# Patient Record
Sex: MALE | Race: OTHER | HISPANIC OR LATINO | URBAN - METROPOLITAN AREA
[De-identification: names, ages, dates, MRNs, and addresses within clinical notes are randomized per-mention and may not be internally consistent; named-entity substitution may affect disease eponyms.]

---

## 2019-09-17 ENCOUNTER — EMERGENCY (EMERGENCY)
Facility: HOSPITAL | Age: 39
LOS: 1 days | Discharge: OTHER INSTITUTION | End: 2019-09-17
Attending: EMERGENCY MEDICINE
Payer: COMMERCIAL

## 2019-09-17 VITALS
WEIGHT: 147.93 LBS | RESPIRATION RATE: 16 BRPM | DIASTOLIC BLOOD PRESSURE: 84 MMHG | SYSTOLIC BLOOD PRESSURE: 129 MMHG | OXYGEN SATURATION: 97 % | HEIGHT: 63.39 IN | TEMPERATURE: 98 F | HEART RATE: 96 BPM

## 2019-09-17 VITALS
SYSTOLIC BLOOD PRESSURE: 138 MMHG | RESPIRATION RATE: 18 BRPM | OXYGEN SATURATION: 98 % | DIASTOLIC BLOOD PRESSURE: 81 MMHG | HEART RATE: 90 BPM | TEMPERATURE: 99 F

## 2019-09-17 LAB
ACETONE SERPL-MCNC: NEGATIVE — SIGNIFICANT CHANGE UP
ALBUMIN SERPL ELPH-MCNC: 4 G/DL — SIGNIFICANT CHANGE UP (ref 3.3–5.2)
ALP SERPL-CCNC: 156 U/L — HIGH (ref 40–120)
ALT FLD-CCNC: 18 U/L — SIGNIFICANT CHANGE UP
ANION GAP SERPL CALC-SCNC: 17 MMOL/L — SIGNIFICANT CHANGE UP (ref 5–17)
APTT BLD: 26.6 SEC — LOW (ref 27.5–36.3)
AST SERPL-CCNC: 19 U/L — SIGNIFICANT CHANGE UP
BASOPHILS # BLD AUTO: 0.06 K/UL — SIGNIFICANT CHANGE UP (ref 0–0.2)
BASOPHILS NFR BLD AUTO: 0.5 % — SIGNIFICANT CHANGE UP (ref 0–2)
BILIRUB SERPL-MCNC: 0.3 MG/DL — LOW (ref 0.4–2)
BUN SERPL-MCNC: 21 MG/DL — HIGH (ref 8–20)
CALCIUM SERPL-MCNC: 9.2 MG/DL — SIGNIFICANT CHANGE UP (ref 8.6–10.2)
CHLORIDE SERPL-SCNC: 88 MMOL/L — LOW (ref 98–107)
CK SERPL-CCNC: 119 U/L — SIGNIFICANT CHANGE UP (ref 30–200)
CO2 SERPL-SCNC: 20 MMOL/L — LOW (ref 22–29)
CREAT SERPL-MCNC: 1.55 MG/DL — HIGH (ref 0.5–1.3)
EOSINOPHIL # BLD AUTO: 0.18 K/UL — SIGNIFICANT CHANGE UP (ref 0–0.5)
EOSINOPHIL NFR BLD AUTO: 1.5 % — SIGNIFICANT CHANGE UP (ref 0–6)
GLUCOSE SERPL-MCNC: 741 MG/DL — CRITICAL HIGH (ref 70–115)
HCT VFR BLD CALC: 35.9 % — LOW (ref 39–50)
HGB BLD-MCNC: 12.3 G/DL — LOW (ref 13–17)
IMM GRANULOCYTES NFR BLD AUTO: 1 % — SIGNIFICANT CHANGE UP (ref 0–1.5)
INR BLD: 0.94 RATIO — SIGNIFICANT CHANGE UP (ref 0.88–1.16)
LACTATE BLDV-MCNC: 2.4 MMOL/L — HIGH (ref 0.5–2)
LYMPHOCYTES # BLD AUTO: 2.98 K/UL — SIGNIFICANT CHANGE UP (ref 1–3.3)
LYMPHOCYTES # BLD AUTO: 25.4 % — SIGNIFICANT CHANGE UP (ref 13–44)
MCHC RBC-ENTMCNC: 30.1 PG — SIGNIFICANT CHANGE UP (ref 27–34)
MCHC RBC-ENTMCNC: 34.3 GM/DL — SIGNIFICANT CHANGE UP (ref 32–36)
MCV RBC AUTO: 88 FL — SIGNIFICANT CHANGE UP (ref 80–100)
MONOCYTES # BLD AUTO: 0.87 K/UL — SIGNIFICANT CHANGE UP (ref 0–0.9)
MONOCYTES NFR BLD AUTO: 7.4 % — SIGNIFICANT CHANGE UP (ref 2–14)
NEUTROPHILS # BLD AUTO: 7.54 K/UL — HIGH (ref 1.8–7.4)
NEUTROPHILS NFR BLD AUTO: 64.2 % — SIGNIFICANT CHANGE UP (ref 43–77)
PLATELET # BLD AUTO: 240 K/UL — SIGNIFICANT CHANGE UP (ref 150–400)
POTASSIUM SERPL-MCNC: 4.3 MMOL/L — SIGNIFICANT CHANGE UP (ref 3.5–5.3)
POTASSIUM SERPL-SCNC: 4.3 MMOL/L — SIGNIFICANT CHANGE UP (ref 3.5–5.3)
PROT SERPL-MCNC: 8.4 G/DL — SIGNIFICANT CHANGE UP (ref 6.6–8.7)
PROTHROM AB SERPL-ACNC: 10.8 SEC — SIGNIFICANT CHANGE UP (ref 10–12.9)
RBC # BLD: 4.08 M/UL — LOW (ref 4.2–5.8)
RBC # FLD: 12 % — SIGNIFICANT CHANGE UP (ref 10.3–14.5)
SODIUM SERPL-SCNC: 125 MMOL/L — LOW (ref 135–145)
WBC # BLD: 11.75 K/UL — HIGH (ref 3.8–10.5)
WBC # FLD AUTO: 11.75 K/UL — HIGH (ref 3.8–10.5)

## 2019-09-17 PROCEDURE — 96375 TX/PRO/DX INJ NEW DRUG ADDON: CPT

## 2019-09-17 PROCEDURE — 85027 COMPLETE CBC AUTOMATED: CPT

## 2019-09-17 PROCEDURE — T1013: CPT

## 2019-09-17 PROCEDURE — 96374 THER/PROPH/DIAG INJ IV PUSH: CPT

## 2019-09-17 PROCEDURE — 36415 COLL VENOUS BLD VENIPUNCTURE: CPT

## 2019-09-17 PROCEDURE — 73130 X-RAY EXAM OF HAND: CPT | Mod: 26,RT,77

## 2019-09-17 PROCEDURE — 99284 EMERGENCY DEPT VISIT MOD MDM: CPT

## 2019-09-17 PROCEDURE — 96361 HYDRATE IV INFUSION ADD-ON: CPT

## 2019-09-17 PROCEDURE — 82962 GLUCOSE BLOOD TEST: CPT

## 2019-09-17 PROCEDURE — 85610 PROTHROMBIN TIME: CPT

## 2019-09-17 PROCEDURE — 80053 COMPREHEN METABOLIC PANEL: CPT

## 2019-09-17 PROCEDURE — 73130 X-RAY EXAM OF HAND: CPT | Mod: 26,LT

## 2019-09-17 PROCEDURE — 82009 KETONE BODYS QUAL: CPT

## 2019-09-17 PROCEDURE — 99285 EMERGENCY DEPT VISIT HI MDM: CPT | Mod: 25

## 2019-09-17 PROCEDURE — 83605 ASSAY OF LACTIC ACID: CPT

## 2019-09-17 PROCEDURE — 85730 THROMBOPLASTIN TIME PARTIAL: CPT

## 2019-09-17 PROCEDURE — 82550 ASSAY OF CK (CPK): CPT

## 2019-09-17 PROCEDURE — 73130 X-RAY EXAM OF HAND: CPT

## 2019-09-17 PROCEDURE — 90715 TDAP VACCINE 7 YRS/> IM: CPT

## 2019-09-17 RX ORDER — SODIUM CHLORIDE 9 MG/ML
1000 INJECTION INTRAMUSCULAR; INTRAVENOUS; SUBCUTANEOUS ONCE
Refills: 0 | Status: COMPLETED | OUTPATIENT
Start: 2019-09-17 | End: 2019-09-17

## 2019-09-17 RX ORDER — INSULIN HUMAN 100 [IU]/ML
5 INJECTION, SOLUTION SUBCUTANEOUS ONCE
Refills: 0 | Status: COMPLETED | OUTPATIENT
Start: 2019-09-17 | End: 2019-09-17

## 2019-09-17 RX ORDER — INSULIN HUMAN 100 [IU]/ML
10 INJECTION, SOLUTION SUBCUTANEOUS ONCE
Refills: 0 | Status: COMPLETED | OUTPATIENT
Start: 2019-09-17 | End: 2019-09-17

## 2019-09-17 RX ORDER — VANCOMYCIN HCL 1 G
1000 VIAL (EA) INTRAVENOUS ONCE
Refills: 0 | Status: COMPLETED | OUTPATIENT
Start: 2019-09-17 | End: 2019-09-17

## 2019-09-17 RX ORDER — MORPHINE SULFATE 50 MG/1
4 CAPSULE, EXTENDED RELEASE ORAL ONCE
Refills: 0 | Status: DISCONTINUED | OUTPATIENT
Start: 2019-09-17 | End: 2019-09-17

## 2019-09-17 RX ORDER — TETANUS TOXOID, REDUCED DIPHTHERIA TOXOID AND ACELLULAR PERTUSSIS VACCINE, ADSORBED 5; 2.5; 8; 8; 2.5 [IU]/.5ML; [IU]/.5ML; UG/.5ML; UG/.5ML; UG/.5ML
0.5 SUSPENSION INTRAMUSCULAR ONCE
Refills: 0 | Status: COMPLETED | OUTPATIENT
Start: 2019-09-17 | End: 2019-09-17

## 2019-09-17 RX ADMIN — INSULIN HUMAN 10 UNIT(S): 100 INJECTION, SOLUTION SUBCUTANEOUS at 19:58

## 2019-09-17 RX ADMIN — SODIUM CHLORIDE 1000 MILLILITER(S): 9 INJECTION INTRAMUSCULAR; INTRAVENOUS; SUBCUTANEOUS at 18:42

## 2019-09-17 RX ADMIN — SODIUM CHLORIDE 1000 MILLILITER(S): 9 INJECTION INTRAMUSCULAR; INTRAVENOUS; SUBCUTANEOUS at 20:03

## 2019-09-17 RX ADMIN — MORPHINE SULFATE 4 MILLIGRAM(S): 50 CAPSULE, EXTENDED RELEASE ORAL at 18:21

## 2019-09-17 RX ADMIN — SODIUM CHLORIDE 1000 MILLILITER(S): 9 INJECTION INTRAMUSCULAR; INTRAVENOUS; SUBCUTANEOUS at 19:12

## 2019-09-17 RX ADMIN — MORPHINE SULFATE 4 MILLIGRAM(S): 50 CAPSULE, EXTENDED RELEASE ORAL at 18:03

## 2019-09-17 RX ADMIN — INSULIN HUMAN 5 UNIT(S): 100 INJECTION, SOLUTION SUBCUTANEOUS at 19:54

## 2019-09-17 RX ADMIN — SODIUM CHLORIDE 1000 MILLILITER(S): 9 INJECTION INTRAMUSCULAR; INTRAVENOUS; SUBCUTANEOUS at 18:03

## 2019-09-17 RX ADMIN — TETANUS TOXOID, REDUCED DIPHTHERIA TOXOID AND ACELLULAR PERTUSSIS VACCINE, ADSORBED 0.5 MILLILITER(S): 5; 2.5; 8; 8; 2.5 SUSPENSION INTRAMUSCULAR at 18:03

## 2019-09-17 RX ADMIN — Medication 250 MILLIGRAM(S): at 19:12
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
## 2019-09-17 NOTE — ED PROVIDER NOTE - NSRISKOFTRANSFER_ED_A_ED
Transportation Risk (There is always a risk of traffic delays resulting in deterioration of condition.)/Death or Disability/Other:/Increased Pain/Deterioration of Condition En Route
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
## 2021-02-11 ENCOUNTER — EMERGENCY (EMERGENCY)
Facility: HOSPITAL | Age: 41
LOS: 1 days | Discharge: HOME | End: 2021-02-11
Attending: STUDENT IN AN ORGANIZED HEALTH CARE EDUCATION/TRAINING PROGRAM
Payer: COMMERCIAL

## 2021-02-11 VITALS
DIASTOLIC BLOOD PRESSURE: 77 MMHG | WEIGHT: 179.9 LBS | OXYGEN SATURATION: 96 % | SYSTOLIC BLOOD PRESSURE: 122 MMHG | RESPIRATION RATE: 18 BRPM | HEART RATE: 118 BPM | TEMPERATURE: 100 F | HEIGHT: 63.39 IN

## 2021-02-11 PROCEDURE — 99285 EMERGENCY DEPT VISIT HI MDM: CPT

## 2021-02-11 RX ORDER — ACETAMINOPHEN 500 MG
975 TABLET ORAL ONCE
Refills: 0 | Status: COMPLETED | OUTPATIENT
Start: 2021-02-11 | End: 2021-02-11

## 2021-02-11 RX ORDER — SODIUM CHLORIDE 9 MG/ML
1000 INJECTION INTRAMUSCULAR; INTRAVENOUS; SUBCUTANEOUS ONCE
Refills: 0 | Status: COMPLETED | OUTPATIENT
Start: 2021-02-11 | End: 2021-02-11

## 2021-02-11 RX ORDER — ONDANSETRON 8 MG/1
4 TABLET, FILM COATED ORAL ONCE
Refills: 0 | Status: COMPLETED | OUTPATIENT
Start: 2021-02-11 | End: 2021-02-11

## 2021-02-11 NOTE — ED ADULT TRIAGE NOTE - CHIEF COMPLAINT QUOTE
C/o nausea and vomiting since 12pm. +Fever and chills. Denies abdominal pain. Pt states he is unable to tolerate PO. Hx of DMT2, POCT .

## 2021-02-12 VITALS
DIASTOLIC BLOOD PRESSURE: 76 MMHG | TEMPERATURE: 99 F | OXYGEN SATURATION: 97 % | HEART RATE: 99 BPM | RESPIRATION RATE: 16 BRPM | SYSTOLIC BLOOD PRESSURE: 123 MMHG

## 2021-02-12 PROBLEM — E11.9 TYPE 2 DIABETES MELLITUS WITHOUT COMPLICATIONS: Chronic | Status: ACTIVE | Noted: 2019-09-17

## 2021-02-12 LAB
ACETONE SERPL-MCNC: NEGATIVE — SIGNIFICANT CHANGE UP
ALBUMIN SERPL ELPH-MCNC: 4.2 G/DL — SIGNIFICANT CHANGE UP (ref 3.3–5.2)
ALP SERPL-CCNC: 100 U/L — SIGNIFICANT CHANGE UP (ref 40–120)
ALT FLD-CCNC: 13 U/L — SIGNIFICANT CHANGE UP
ANION GAP SERPL CALC-SCNC: 15 MMOL/L — SIGNIFICANT CHANGE UP (ref 5–17)
AST SERPL-CCNC: 16 U/L — SIGNIFICANT CHANGE UP
BASE EXCESS BLDV CALC-SCNC: 2.6 MMOL/L — HIGH (ref -2–2)
BASOPHILS # BLD AUTO: 0.06 K/UL — SIGNIFICANT CHANGE UP (ref 0–0.2)
BASOPHILS NFR BLD AUTO: 0.3 % — SIGNIFICANT CHANGE UP (ref 0–2)
BILIRUB SERPL-MCNC: 0.4 MG/DL — SIGNIFICANT CHANGE UP (ref 0.4–2)
BUN SERPL-MCNC: 24 MG/DL — HIGH (ref 8–20)
CA-I SERPL-SCNC: 1.14 MMOL/L — LOW (ref 1.15–1.33)
CALCIUM SERPL-MCNC: 9.6 MG/DL — SIGNIFICANT CHANGE UP (ref 8.6–10.2)
CHLORIDE BLDV-SCNC: 105 MMOL/L — SIGNIFICANT CHANGE UP (ref 98–107)
CHLORIDE SERPL-SCNC: 102 MMOL/L — SIGNIFICANT CHANGE UP (ref 98–107)
CO2 SERPL-SCNC: 24 MMOL/L — SIGNIFICANT CHANGE UP (ref 22–29)
CREAT SERPL-MCNC: 1.42 MG/DL — HIGH (ref 0.5–1.3)
EOSINOPHIL # BLD AUTO: 0.09 K/UL — SIGNIFICANT CHANGE UP (ref 0–0.5)
EOSINOPHIL NFR BLD AUTO: 0.5 % — SIGNIFICANT CHANGE UP (ref 0–6)
GAS PNL BLDV: 145 MMOL/L — SIGNIFICANT CHANGE UP (ref 135–145)
GAS PNL BLDV: SIGNIFICANT CHANGE UP
GAS PNL BLDV: SIGNIFICANT CHANGE UP
GLUCOSE BLDV-MCNC: 293 MG/DL — HIGH (ref 70–99)
GLUCOSE SERPL-MCNC: 297 MG/DL — HIGH (ref 70–99)
HCO3 BLDV-SCNC: 27 MMOL/L — SIGNIFICANT CHANGE UP (ref 21–29)
HCT VFR BLD CALC: 33.8 % — LOW (ref 39–50)
HCT VFR BLD CALC: 39.8 % — SIGNIFICANT CHANGE UP (ref 39–50)
HCT VFR BLDA CALC: 43 — SIGNIFICANT CHANGE UP (ref 39–50)
HGB BLD CALC-MCNC: 14.1 G/DL — SIGNIFICANT CHANGE UP (ref 13–17)
HGB BLD-MCNC: 11.6 G/DL — LOW (ref 13–17)
HGB BLD-MCNC: 14 G/DL — SIGNIFICANT CHANGE UP (ref 13–17)
IMM GRANULOCYTES NFR BLD AUTO: 1.3 % — SIGNIFICANT CHANGE UP (ref 0–1.5)
LACTATE BLDV-MCNC: 1.9 MMOL/L — SIGNIFICANT CHANGE UP (ref 0.5–2)
LIDOCAIN IGE QN: 89 U/L — HIGH (ref 22–51)
LYMPHOCYTES # BLD AUTO: 1.74 K/UL — SIGNIFICANT CHANGE UP (ref 1–3.3)
LYMPHOCYTES # BLD AUTO: 9.2 % — LOW (ref 13–44)
MCHC RBC-ENTMCNC: 29 PG — SIGNIFICANT CHANGE UP (ref 27–34)
MCHC RBC-ENTMCNC: 29.4 PG — SIGNIFICANT CHANGE UP (ref 27–34)
MCHC RBC-ENTMCNC: 34.3 GM/DL — SIGNIFICANT CHANGE UP (ref 32–36)
MCHC RBC-ENTMCNC: 35.2 GM/DL — SIGNIFICANT CHANGE UP (ref 32–36)
MCV RBC AUTO: 83.4 FL — SIGNIFICANT CHANGE UP (ref 80–100)
MCV RBC AUTO: 84.5 FL — SIGNIFICANT CHANGE UP (ref 80–100)
MONOCYTES # BLD AUTO: 1.81 K/UL — HIGH (ref 0–0.9)
MONOCYTES NFR BLD AUTO: 9.6 % — SIGNIFICANT CHANGE UP (ref 2–14)
NEUTROPHILS # BLD AUTO: 14.99 K/UL — HIGH (ref 1.8–7.4)
NEUTROPHILS NFR BLD AUTO: 79.1 % — HIGH (ref 43–77)
OTHER CELLS CSF MANUAL: 19 ML/DL — SIGNIFICANT CHANGE UP (ref 18–22)
PCO2 BLDV: 40 MMHG — SIGNIFICANT CHANGE UP (ref 35–50)
PH BLDV: 7.44 — HIGH (ref 7.32–7.43)
PLATELET # BLD AUTO: 262 K/UL — SIGNIFICANT CHANGE UP (ref 150–400)
PLATELET # BLD AUTO: 278 K/UL — SIGNIFICANT CHANGE UP (ref 150–400)
PO2 BLDV: 110 MMHG — HIGH (ref 25–45)
POTASSIUM BLDV-SCNC: 3.9 MMOL/L — SIGNIFICANT CHANGE UP (ref 3.4–4.5)
POTASSIUM SERPL-MCNC: 3.9 MMOL/L — SIGNIFICANT CHANGE UP (ref 3.5–5.3)
POTASSIUM SERPL-SCNC: 3.9 MMOL/L — SIGNIFICANT CHANGE UP (ref 3.5–5.3)
PROT SERPL-MCNC: 8.3 G/DL — SIGNIFICANT CHANGE UP (ref 6.6–8.7)
RBC # BLD: 4 M/UL — LOW (ref 4.2–5.8)
RBC # BLD: 4.77 M/UL — SIGNIFICANT CHANGE UP (ref 4.2–5.8)
RBC # FLD: 13.2 % — SIGNIFICANT CHANGE UP (ref 10.3–14.5)
RBC # FLD: 13.4 % — SIGNIFICANT CHANGE UP (ref 10.3–14.5)
SAO2 % BLDV: 98 % — SIGNIFICANT CHANGE UP
SARS-COV-2 RNA SPEC QL NAA+PROBE: SIGNIFICANT CHANGE UP
SODIUM SERPL-SCNC: 141 MMOL/L — SIGNIFICANT CHANGE UP (ref 135–145)
WBC # BLD: 18.25 K/UL — HIGH (ref 3.8–10.5)
WBC # BLD: 18.94 K/UL — HIGH (ref 3.8–10.5)
WBC # FLD AUTO: 18.25 K/UL — HIGH (ref 3.8–10.5)
WBC # FLD AUTO: 18.94 K/UL — HIGH (ref 3.8–10.5)

## 2021-02-12 PROCEDURE — 74177 CT ABD & PELVIS W/CONTRAST: CPT | Mod: 26

## 2021-02-12 PROCEDURE — 80053 COMPREHEN METABOLIC PANEL: CPT

## 2021-02-12 PROCEDURE — 82962 GLUCOSE BLOOD TEST: CPT

## 2021-02-12 PROCEDURE — 85018 HEMOGLOBIN: CPT

## 2021-02-12 PROCEDURE — 83605 ASSAY OF LACTIC ACID: CPT

## 2021-02-12 PROCEDURE — 82435 ASSAY OF BLOOD CHLORIDE: CPT

## 2021-02-12 PROCEDURE — 74177 CT ABD & PELVIS W/CONTRAST: CPT

## 2021-02-12 PROCEDURE — 82009 KETONE BODYS QUAL: CPT

## 2021-02-12 PROCEDURE — 84132 ASSAY OF SERUM POTASSIUM: CPT

## 2021-02-12 PROCEDURE — 85027 COMPLETE CBC AUTOMATED: CPT

## 2021-02-12 PROCEDURE — 83690 ASSAY OF LIPASE: CPT

## 2021-02-12 PROCEDURE — 36415 COLL VENOUS BLD VENIPUNCTURE: CPT

## 2021-02-12 PROCEDURE — 82330 ASSAY OF CALCIUM: CPT

## 2021-02-12 PROCEDURE — 99284 EMERGENCY DEPT VISIT MOD MDM: CPT | Mod: 25

## 2021-02-12 PROCEDURE — U0005: CPT

## 2021-02-12 PROCEDURE — 85014 HEMATOCRIT: CPT

## 2021-02-12 PROCEDURE — 85025 COMPLETE CBC W/AUTO DIFF WBC: CPT

## 2021-02-12 PROCEDURE — 82947 ASSAY GLUCOSE BLOOD QUANT: CPT

## 2021-02-12 PROCEDURE — 96374 THER/PROPH/DIAG INJ IV PUSH: CPT | Mod: XU

## 2021-02-12 PROCEDURE — 96361 HYDRATE IV INFUSION ADD-ON: CPT

## 2021-02-12 PROCEDURE — 84295 ASSAY OF SERUM SODIUM: CPT

## 2021-02-12 PROCEDURE — 82803 BLOOD GASES ANY COMBINATION: CPT

## 2021-02-12 PROCEDURE — U0003: CPT

## 2021-02-12 RX ORDER — SODIUM CHLORIDE 9 MG/ML
1000 INJECTION INTRAMUSCULAR; INTRAVENOUS; SUBCUTANEOUS ONCE
Refills: 0 | Status: COMPLETED | OUTPATIENT
Start: 2021-02-12 | End: 2021-02-12

## 2021-02-12 RX ADMIN — Medication 975 MILLIGRAM(S): at 00:13

## 2021-02-12 RX ADMIN — SODIUM CHLORIDE 1000 MILLILITER(S): 9 INJECTION INTRAMUSCULAR; INTRAVENOUS; SUBCUTANEOUS at 02:04

## 2021-02-12 RX ADMIN — ONDANSETRON 4 MILLIGRAM(S): 8 TABLET, FILM COATED ORAL at 00:13

## 2021-02-12 RX ADMIN — SODIUM CHLORIDE 1000 MILLILITER(S): 9 INJECTION INTRAMUSCULAR; INTRAVENOUS; SUBCUTANEOUS at 00:13

## 2021-02-12 NOTE — ED PROVIDER NOTE - PROGRESS NOTE DETAILS
Pt re-assessed, noting improvement in vomiting s/p meds. No vomiting at this time. Pt resting comfortably at time of re-assessment. No vomiting while in ED. Abdomen soft/non-tender. Tolerating PO intake. Given rpt wbc of 18k will check ct abd/pel CT showing "Marked wall thickening of the distal esophagus, suggestive of esophagitis. Consider upper endoscopy for further evaluation." likely 2/2 vomiting. results including incidental findings d/w pt, instructed to f/u with GI for endoscopy. return precautions discussed, stable for dc

## 2021-02-12 NOTE — ED PROVIDER NOTE - PHYSICAL EXAMINATION
Gen: No acute distress, non toxic  HEENT: Mucous membranes moist, pink conjunctivae, EOMI  CV: RRR, nl s1/s2.  Resp: CTAB, normal rate and effort  GI: Abdomen soft, NT, ND. No rebound, no guarding  Neuro: A&O x 3, moving all 4 extremities  MSK: No spine or joint tenderness to palpation  Skin: No rashes. intact and perfused.

## 2021-02-12 NOTE — ED PROVIDER NOTE - OBJECTIVE STATEMENT
39yo M pmhx DMII on metformin, insulin presents to ED c/o nausea and vomiting onset 12pm today. States he has been vomiting the entire day approx "30-40 times" and is unable to tolerate PO. States his throat is a little sore from vomiting so much. No abdominal pain. No recent travel or sick contacts. Felt fine all day yesterday. Did not feel like he had a fever at home. Temp 99.5 oral in triage,  bpm. No hx similar symptoms in past. No hx DKA. Denies cp, sob, diarrhea, recent travel, hx abdominal surgery.   : Luis Reyes

## 2021-02-12 NOTE — ED ADULT NURSE NOTE - OBJECTIVE STATEMENT
Patient A&Ox4 complaining of vomiting x1 day.  Pt with hx of DM, takes metformin.  Denies SOB, abdominal pain, fever, chills, recent sick contacts, diarrhea.  NAD noted, respirations even and unlabored.  Safety precautions in place.  Plan of care explained, pt verbalized understanding.  services used. KAVON Mark at bedside for eval.

## 2021-02-12 NOTE — ED PROVIDER NOTE - ATTENDING CONTRIBUTION TO CARE
41yo male with pmh of DM on insulin presents with nausea and vomiting for 12 hours about 30-40 times, Pt states he's been unable to tolerate PO, emesis nbnb. Pt also reports sore throat from throwing up so much. Pt states night prior to symptoms onset pt had some chicken wings and wasn't sure if that was causing his symptoms. Pt also with loose stools. Pt denies fevers/chills, ha, loc, focal neuro deficits, cp/sob/palp, cough, abd pain, urinary symptoms, recent travel and sick contacts.  Const: Awake, alert and oriented. In no acute distress. Well appearing.  HEENT: NC/AT. Moist mucous membranes.  Eyes: No scleral icterus. EOMI.  Neck:. Soft and supple. Full ROM without pain.  Cardiac: Regular rate (80s) and regular rhythm. +S1/S2. Peripheral pulses 2+ and symmetric. No LE edema.  Resp: Speaking in full sentences. No evidence of respiratory distress. No wheezes, rales or rhonchi.  Abd: Soft, non-tender, non-distended. Normal bowel sounds in all 4 quadrants. No guarding or rebound.  Back: Spine midline and non-tender. No CVAT.  Skin: No rashes, abrasions or lacerations.  Lymph: No cervical lymphadenopathy.  Neuro: Awake, alert & oriented x 3. Moves all extremities symmetrically.  labs, ct, anti emetic  pt tolerated po, follow up with pmd

## 2021-02-12 NOTE — ED PROVIDER NOTE - CLINICAL SUMMARY MEDICAL DECISION MAKING FREE TEXT BOX
41yo M with persistent vomiting today. No abdominal pain, Abd exam unremarkable. Will hydrate with IVF, give zofran, check labs to eval for dka, covid swab and re-assess.

## 2021-02-12 NOTE — ED PROVIDER NOTE - NSFOLLOWUPINSTRUCTIONS_ED_ALL_ED_FT
- Follow up with your doctor within 2-3 days.   - Return to the ED for any new or worsening symptoms.   - Follow-up with GI doctor for further evaluation within 1-2 weeks  - Your CT scan also showed "indeterminate right lower pole renal lesions" You may follow-up with your primary doctor for further evaluation of this finding.    Nausea / Vomiting    Nausea is the feeling that you have to vomit. As nausea gets worse, it can lead to vomiting. Vomiting puts you at an increased risk for dehydration. Older adults and people with other diseases or a weak immune system are at higher risk for dehydration. Drink clear fluids in small but frequent amounts as tolerated. Eat bland, easy-to-digest foods in small amounts as tolerated.    SEEK IMMEDIATE MEDICAL CARE IF YOU HAVE ANY OF THE FOLLOWING SYMPTOMS: fever, inability to keep sufficient fluids down, black or bloody vomitus, black or bloody stools, lightheadedness/dizziness, chest pain, severe headache, rash, shortness of breath, cold or clammy skin, confusion, pain with urination, or any signs of dehydration.     - Seguimiento con jorge médico dentro de 2-3 días.   - Volver al ED para cualquier síntoma nuevo o empeoramiento.   - Seguimiento con el médico GI para adilene evaluación adicional dentro de 1-2 semanas  - Jorge tomografía computarizada también mostró "lesiones renales indeterminadas del polo inferior derecho" Puede realizar un seguimiento con jorge médico de cabecera para adilene evaluación adicional de pascual hallazgo.    Náuseas / Vómitos    Las náuseas son la sensación de que tienes que vomitar. A medida que las náuseas empeoran, puede provocar vómitos. Los vómitos aumentan el riesgo de deshidratación. Los adultos mayores y las personas con otras enfermedades o un sistema inmunitario débil tienen un mayor riesgo de deshidratación. [place] líquidos transparentes en pequeñas rani frecuentes cantidades según lo tolerado. Coma alimentos blandos y fáciles de digerir en pequeñas cantidades según lo tolerado.    BUSCA CUIDADO MEDICAL INMEDIATO SI TIENE CUALQUIERA DE LOS SIGUIENTES SYMPTOMS: fiebre, incapacidad para mantener suficientes líquidos abajo, vómitos negros o sangrientos, heces negras o sangrantes, aturdimiento/mareo, dolor en el pecho, dolor de robert intenso, erupción cutánea, dificultad para respirar, piel fría o húmeda, confusión, dolor con la micción, o cualquier signo de deshidratación.

## 2021-02-12 NOTE — ED PROVIDER NOTE - PATIENT PORTAL LINK FT
You can access the FollowMyHealth Patient Portal offered by [facility] by registering at the following website: [email or web address]. By joining [employer]’s FollowMyHealth portal, you will also be able to view your health information using other applications (apps) compatible with our system.

## 2021-02-12 NOTE — ED PROVIDER NOTE - CARE PROVIDER_API CALL
Jeff Weber)  Gastroenterology; Internal Medicine  [address] [place] [address]  Phone: [phone]  Fax: [phone]  Follow Up Time:

## 2021-03-25 NOTE — ED ADULT NURSE NOTE - NS ED NOTE  TALK SOMEONE YN
Caller: Rod Burnham    Relationship: Self    Best call back number: [phone]    Medication needed:   Requested Prescriptions     Pending Prescriptions Disp Refills   • Adderall XR 30 MG 24 hr capsule 30 capsule 0     Sig: Take 1 capsule by mouth Daily       When do you need the refill by: NONE LEFT.         Does the patient have less than a 3 day supply:  [x] Yes  [] No    What is the patient's preferred pharmacy: [facility] [address] [place] [address] [phone] [facility] [phone]              
No

## 2022-05-29 ENCOUNTER — INPATIENT (INPATIENT)
Facility: HOSPITAL | Age: 42
LOS: 9 days | Discharge: HOME | DRG: 617 | End: 2022-06-08
Attending: STUDENT IN AN ORGANIZED HEALTH CARE EDUCATION/TRAINING PROGRAM | Admitting: STUDENT IN AN ORGANIZED HEALTH CARE EDUCATION/TRAINING PROGRAM
Payer: COMMERCIAL

## 2022-05-29 VITALS
OXYGEN SATURATION: 99 % | WEIGHT: 147.49 LBS | TEMPERATURE: 99 F | HEIGHT: 63.39 IN | DIASTOLIC BLOOD PRESSURE: 84 MMHG | RESPIRATION RATE: 18 BRPM | SYSTOLIC BLOOD PRESSURE: 120 MMHG | HEART RATE: 89 BPM

## 2022-05-29 DIAGNOSIS — E11.621 TYPE 2 DIABETES MELLITUS WITH FOOT ULCER: ICD-10-CM

## 2022-05-29 DIAGNOSIS — Z98.890 OTHER SPECIFIED POSTPROCEDURAL STATES: Chronic | ICD-10-CM

## 2022-05-29 LAB
ACETONE SERPL-MCNC: NEGATIVE — SIGNIFICANT CHANGE UP
ALBUMIN SERPL ELPH-MCNC: 4.2 G/DL — SIGNIFICANT CHANGE UP (ref 3.3–5.2)
ALP SERPL-CCNC: 97 U/L — SIGNIFICANT CHANGE UP (ref 40–120)
ALT FLD-CCNC: 25 U/L — SIGNIFICANT CHANGE UP
ANION GAP SERPL CALC-SCNC: 13 MMOL/L — SIGNIFICANT CHANGE UP (ref 5–17)
APTT BLD: 36 SEC — HIGH (ref 27.5–35.5)
AST SERPL-CCNC: 31 U/L — SIGNIFICANT CHANGE UP
BASE EXCESS BLDV CALC-SCNC: 1.4 MMOL/L — SIGNIFICANT CHANGE UP (ref -2–3)
BASOPHILS # BLD AUTO: 0.06 K/UL — SIGNIFICANT CHANGE UP (ref 0–0.2)
BASOPHILS NFR BLD AUTO: 0.7 % — SIGNIFICANT CHANGE UP (ref 0–2)
BILIRUB SERPL-MCNC: 0.3 MG/DL — LOW (ref 0.4–2)
BLD GP AB SCN SERPL QL: SIGNIFICANT CHANGE UP
BUN SERPL-MCNC: 17.5 MG/DL — SIGNIFICANT CHANGE UP (ref 8–20)
CA-I SERPL-SCNC: 1.21 MMOL/L — SIGNIFICANT CHANGE UP (ref 1.15–1.33)
CALCIUM SERPL-MCNC: 9.1 MG/DL — SIGNIFICANT CHANGE UP (ref 8.6–10.2)
CHLORIDE BLDV-SCNC: 104 MMOL/L — SIGNIFICANT CHANGE UP (ref 98–107)
CHLORIDE SERPL-SCNC: 100 MMOL/L — SIGNIFICANT CHANGE UP (ref 98–107)
CO2 SERPL-SCNC: 25 MMOL/L — SIGNIFICANT CHANGE UP (ref 22–29)
CREAT SERPL-MCNC: 1.15 MG/DL — SIGNIFICANT CHANGE UP (ref 0.5–1.3)
CRP SERPL-MCNC: 17 MG/L — HIGH
EGFR: 81 ML/MIN/1.73M2 — SIGNIFICANT CHANGE UP
EOSINOPHIL # BLD AUTO: 0.14 K/UL — SIGNIFICANT CHANGE UP (ref 0–0.5)
EOSINOPHIL NFR BLD AUTO: 1.6 % — SIGNIFICANT CHANGE UP (ref 0–6)
ERYTHROCYTE [SEDIMENTATION RATE] IN BLOOD: 31 MM/HR — HIGH (ref 0–20)
FLUAV AG NPH QL: SIGNIFICANT CHANGE UP
FLUBV AG NPH QL: SIGNIFICANT CHANGE UP
GAS PNL BLDV: 139 MMOL/L — SIGNIFICANT CHANGE UP (ref 136–145)
GAS PNL BLDV: SIGNIFICANT CHANGE UP
GLUCOSE BLDC GLUCOMTR-MCNC: 113 MG/DL — HIGH (ref 70–99)
GLUCOSE BLDV-MCNC: 106 MG/DL — HIGH (ref 70–99)
GLUCOSE SERPL-MCNC: 110 MG/DL — HIGH (ref 70–99)
HCO3 BLDV-SCNC: 28 MMOL/L — SIGNIFICANT CHANGE UP (ref 22–29)
HCT VFR BLD CALC: 41 % — SIGNIFICANT CHANGE UP (ref 39–50)
HCT VFR BLDA CALC: 42 % — SIGNIFICANT CHANGE UP
HGB BLD CALC-MCNC: 13.9 G/DL — SIGNIFICANT CHANGE UP (ref 12.6–17.4)
HGB BLD-MCNC: 13.1 G/DL — SIGNIFICANT CHANGE UP (ref 13–17)
IMM GRANULOCYTES NFR BLD AUTO: 0.9 % — SIGNIFICANT CHANGE UP (ref 0–1.5)
INR BLD: 1.14 RATIO — SIGNIFICANT CHANGE UP (ref 0.88–1.16)
LACTATE BLDV-MCNC: 0.8 MMOL/L — SIGNIFICANT CHANGE UP (ref 0.5–2)
LYMPHOCYTES # BLD AUTO: 3.13 K/UL — SIGNIFICANT CHANGE UP (ref 1–3.3)
LYMPHOCYTES # BLD AUTO: 35.9 % — SIGNIFICANT CHANGE UP (ref 13–44)
MCHC RBC-ENTMCNC: 28.6 PG — SIGNIFICANT CHANGE UP (ref 27–34)
MCHC RBC-ENTMCNC: 32 GM/DL — SIGNIFICANT CHANGE UP (ref 32–36)
MCV RBC AUTO: 89.5 FL — SIGNIFICANT CHANGE UP (ref 80–100)
MONOCYTES # BLD AUTO: 0.73 K/UL — SIGNIFICANT CHANGE UP (ref 0–0.9)
MONOCYTES NFR BLD AUTO: 8.4 % — SIGNIFICANT CHANGE UP (ref 2–14)
NEUTROPHILS # BLD AUTO: 4.57 K/UL — SIGNIFICANT CHANGE UP (ref 1.8–7.4)
NEUTROPHILS NFR BLD AUTO: 52.5 % — SIGNIFICANT CHANGE UP (ref 43–77)
PCO2 BLDV: 55 MMHG — SIGNIFICANT CHANGE UP (ref 42–55)
PH BLDV: 7.31 — LOW (ref 7.32–7.43)
PLATELET # BLD AUTO: 394 K/UL — SIGNIFICANT CHANGE UP (ref 150–400)
PO2 BLDV: 57 MMHG — HIGH (ref 25–45)
POTASSIUM BLDV-SCNC: 4.3 MMOL/L — SIGNIFICANT CHANGE UP (ref 3.5–5.1)
POTASSIUM SERPL-MCNC: 4.1 MMOL/L — SIGNIFICANT CHANGE UP (ref 3.5–5.3)
POTASSIUM SERPL-SCNC: 4.1 MMOL/L — SIGNIFICANT CHANGE UP (ref 3.5–5.3)
PROT SERPL-MCNC: 9.5 G/DL — HIGH (ref 6.6–8.7)
PROTHROM AB SERPL-ACNC: 13.2 SEC — SIGNIFICANT CHANGE UP (ref 10.5–13.4)
RBC # BLD: 4.58 M/UL — SIGNIFICANT CHANGE UP (ref 4.2–5.8)
RBC # FLD: 13.6 % — SIGNIFICANT CHANGE UP (ref 10.3–14.5)
RSV RNA NPH QL NAA+NON-PROBE: SIGNIFICANT CHANGE UP
SAO2 % BLDV: 85 % — SIGNIFICANT CHANGE UP
SARS-COV-2 RNA SPEC QL NAA+PROBE: SIGNIFICANT CHANGE UP
SODIUM SERPL-SCNC: 137 MMOL/L — SIGNIFICANT CHANGE UP (ref 135–145)
WBC # BLD: 8.71 K/UL — SIGNIFICANT CHANGE UP (ref 3.8–10.5)
WBC # FLD AUTO: 8.71 K/UL — SIGNIFICANT CHANGE UP (ref 3.8–10.5)

## 2022-05-29 PROCEDURE — 99223 1ST HOSP IP/OBS HIGH 75: CPT

## 2022-05-29 PROCEDURE — 73630 X-RAY EXAM OF FOOT: CPT | Mod: 26,RT

## 2022-05-29 PROCEDURE — 99285 EMERGENCY DEPT VISIT HI MDM: CPT

## 2022-05-29 RX ORDER — ONDANSETRON 8 MG/1
4 TABLET, FILM COATED ORAL EVERY 8 HOURS
Refills: 0 | Status: ACTIVE | OUTPATIENT
Start: 2022-05-29 | End: 2023-04-27

## 2022-05-29 RX ORDER — INSULIN LISPRO 100/ML
VIAL (ML) SUBCUTANEOUS
Refills: 0 | Status: ACTIVE | OUTPATIENT
Start: 2022-05-29 | End: 2023-04-27

## 2022-05-29 RX ORDER — LANOLIN ALCOHOL/MO/W.PET/CERES
3 CREAM (GRAM) TOPICAL AT BEDTIME
Refills: 0 | Status: ACTIVE | OUTPATIENT
Start: 2022-05-29 | End: 2023-04-27

## 2022-05-29 RX ORDER — VANCOMYCIN HCL 1 G
1250 VIAL (EA) INTRAVENOUS EVERY 12 HOURS
Refills: 0 | Status: DISCONTINUED | OUTPATIENT
Start: 2022-05-29 | End: 2022-05-30

## 2022-05-29 RX ORDER — ACETAMINOPHEN 500 MG
650 TABLET ORAL ONCE
Refills: 0 | Status: COMPLETED | OUTPATIENT
Start: 2022-05-29 | End: 2022-05-29

## 2022-05-29 RX ORDER — SODIUM CHLORIDE 9 MG/ML
1000 INJECTION, SOLUTION INTRAVENOUS
Refills: 0 | Status: ACTIVE | OUTPATIENT
Start: 2022-05-29 | End: 2023-04-27

## 2022-05-29 RX ORDER — DEXTROSE 50 % IN WATER 50 %
25 SYRINGE (ML) INTRAVENOUS ONCE
Refills: 0 | Status: ACTIVE | OUTPATIENT
Start: 2022-05-29

## 2022-05-29 RX ORDER — INSULIN LISPRO 100/ML
3 VIAL (ML) SUBCUTANEOUS
Refills: 0 | Status: DISCONTINUED | OUTPATIENT
Start: 2022-05-29 | End: 2022-05-29

## 2022-05-29 RX ORDER — GLUCAGON INJECTION, SOLUTION 0.5 MG/.1ML
1 INJECTION, SOLUTION SUBCUTANEOUS ONCE
Refills: 0 | Status: ACTIVE | OUTPATIENT
Start: 2022-05-29 | End: 2023-04-27

## 2022-05-29 RX ORDER — PIPERACILLIN AND TAZOBACTAM 4; .5 G/20ML; G/20ML
3.38 INJECTION, POWDER, LYOPHILIZED, FOR SOLUTION INTRAVENOUS ONCE
Refills: 0 | Status: COMPLETED | OUTPATIENT
Start: 2022-05-29 | End: 2022-05-29

## 2022-05-29 RX ORDER — GABAPENTIN 400 MG/1
300 CAPSULE ORAL AT BEDTIME
Refills: 0 | Status: ACTIVE | OUTPATIENT
Start: 2022-05-29 | End: 2023-04-27

## 2022-05-29 RX ORDER — PIPERACILLIN AND TAZOBACTAM 4; .5 G/20ML; G/20ML
3.38 INJECTION, POWDER, LYOPHILIZED, FOR SOLUTION INTRAVENOUS EVERY 8 HOURS
Refills: 0 | Status: ACTIVE | OUTPATIENT
Start: 2022-05-29 | End: 2022-06-08

## 2022-05-29 RX ORDER — INSULIN GLARGINE 100 [IU]/ML
10 INJECTION, SOLUTION SUBCUTANEOUS AT BEDTIME
Refills: 0 | Status: ACTIVE | OUTPATIENT
Start: 2022-05-29 | End: 2023-04-27

## 2022-05-29 RX ORDER — VANCOMYCIN HCL 1 G
1250 VIAL (EA) INTRAVENOUS EVERY 12 HOURS
Refills: 0 | Status: DISCONTINUED | OUTPATIENT
Start: 2022-05-29 | End: 2022-05-29

## 2022-05-29 RX ORDER — DEXTROSE 50 % IN WATER 50 %
12.5 SYRINGE (ML) INTRAVENOUS ONCE
Refills: 0 | Status: ACTIVE | OUTPATIENT
Start: 2022-05-29

## 2022-05-29 RX ORDER — DEXTROSE 50 % IN WATER 50 %
15 SYRINGE (ML) INTRAVENOUS ONCE
Refills: 0 | Status: ACTIVE | OUTPATIENT
Start: 2022-05-29 | End: 2023-04-27

## 2022-05-29 RX ORDER — GABAPENTIN 400 MG/1
1 CAPSULE ORAL
Qty: 0 | Refills: 0 | DISCHARGE

## 2022-05-29 RX ORDER — VANCOMYCIN HCL 1 G
1000 VIAL (EA) INTRAVENOUS ONCE
Refills: 0 | Status: COMPLETED | OUTPATIENT
Start: 2022-05-29 | End: 2022-05-29

## 2022-05-29 RX ORDER — ACETAMINOPHEN 500 MG
650 TABLET ORAL EVERY 6 HOURS
Refills: 0 | Status: ACTIVE | OUTPATIENT
Start: 2022-05-29 | End: 2023-04-27

## 2022-05-29 RX ADMIN — Medication 1000 MILLIGRAM(S): at 17:13

## 2022-05-29 RX ADMIN — Medication 166.67 MILLIGRAM(S): at 22:40

## 2022-05-29 RX ADMIN — PIPERACILLIN AND TAZOBACTAM 3.38 GRAM(S): 4; .5 INJECTION, POWDER, LYOPHILIZED, FOR SOLUTION INTRAVENOUS at 12:23

## 2022-05-29 RX ADMIN — Medication 650 MILLIGRAM(S): at 11:42

## 2022-05-29 RX ADMIN — PIPERACILLIN AND TAZOBACTAM 25 GRAM(S): 4; .5 INJECTION, POWDER, LYOPHILIZED, FOR SOLUTION INTRAVENOUS at 21:23

## 2022-05-29 RX ADMIN — INSULIN GLARGINE 10 UNIT(S): 100 INJECTION, SOLUTION SUBCUTANEOUS at 21:22

## 2022-05-29 RX ADMIN — Medication 250 MILLIGRAM(S): at 12:25

## 2022-05-29 RX ADMIN — Medication 650 MILLIGRAM(S): at 11:53

## 2022-05-29 RX ADMIN — PIPERACILLIN AND TAZOBACTAM 200 GRAM(S): 4; .5 INJECTION, POWDER, LYOPHILIZED, FOR SOLUTION INTRAVENOUS at 11:42

## 2022-05-29 RX ADMIN — GABAPENTIN 300 MILLIGRAM(S): 400 CAPSULE ORAL at 21:23

## 2022-05-29 NOTE — H&P ADULT - ASSESSMENT
41 y/o M with PMHx IDDM who presents to the ED c/o right 5th toe infection, admitted for further workup, r/o OM.    #Toe osteomyelitis  - ESR and CRP elevated  - toe xray result pending  - start empiric Vanc and Zosyn  - f/u Vanc trough   - BCx pending   - podiatry consulted, poncho recs  - MRI foot ordered  - will consult ID in AM, pending podiatry recs and MRI foot result     #IDDM  - on home NPH 60U QD  - c/w home gabepentin  - f/u A1C, accuc checks  - start Lantus 10U + ISS, up titrate as needed    DVT: Ambulate as tolerated, SCD  Diet: Consistent carb  Dispo: any bed, pending podiatry consult

## 2022-05-29 NOTE — ED PROVIDER NOTE - ATTENDING APP SHARED VISIT CONTRIBUTION OF CARE
Dr. Gutierrez : I have personally seen and examined this patient at the bedside. I have fully participated in the care of this patient. I have reviewed all pertinent clinical information, including history, physical exam, plan and the Resident's note and agree except as noted.     41 y/o M with PMHx DM pw right toe infection. Patient states that his right 5th toe has been turning black and becoming painful for the past month tx in [facility] with levaquin and cephalosphorin for 3 weeks; stopped taking abx 2 weeks ago.  notes the darkness to toe is improving, saw a pmd 2 days ago who instructed him to come to the ED.   Denies f/c/n/v/cp/sob/palpitations/cough/abd.pain/d/c/dysuria/hematuria. sick contacts/recent travel.    PE:  head; atraumatic normocephalic  eyes: perrla  Heart: rrr s1s2  lungs: ctab  abd: soft, nt nd + bs no rebound/guarding no cva ttp  le: no swelling no calf ttp ; right 5th toe +necrosis  + ulceration to between 4th and 5th digit in wb space; 2+ doralis pedis pulse ; no drainage  back: no midline cervical/thoracic/lumbar ttp      -->necrotic right 5th toe; pt is a diabetic will check labs xray to eval for osteo; podiatry consult; abx

## 2022-05-29 NOTE — H&P ADULT - NSHPLABSRESULTS_GEN_ALL_CORE
LABS:                        13.1   8.71  )-----------( 394      ( 29 May 2022 11:52 )             41.0     05-29    137  |  100  |  17.5  ----------------------------<  110<H>  4.1   |  25.0  |  1.15    Ca    9.1      29 May 2022 11:52    TPro  9.5<H>  /  Alb  4.2  /  TBili  0.3<L>  /  DBili  x   /  AST  31  /  ALT  25  /  AlkPhos  97  05-29    PT/INR - ( 29 May 2022 11:52 )   PT: 13.2 sec;   INR: 1.14 ratio         PTT - ( 29 May 2022 11:52 )  PTT:36.0 sec

## 2022-05-29 NOTE — ED PROVIDER NOTE - NS ED ATTENDING STATEMENT MOD
This was a shared visit with the CAROLYN. I reviewed and verified the documentation and independently performed the documented:

## 2022-05-29 NOTE — CONSULT NOTE ADULT - ASSESSMENT
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    ·	Diabetes with neuropathy    Plan  ·	Patient is seen and evaluated at bed side in [facility].   ·	Ulcer side flushed with betadine and NSS  ·	Betadine dsd applied to right foot  ·	Patient will need amputation of toe with debridement of infected soft tissue and bone with bone biopsy. will schedule early this week.   ·	Vascular consult recommended for evaluation of Peripheral vascular status for healing potential.   ·	Continue with IV antibiotics as per ID recommendations  ·	reviewed x-ray madison changes consistent with possible OM.   ·	DVT prophylaxis and pain management as per Medical team  ·	Podiatry will follow while he is in the hospital.     Thank you for this consult Please call with any questions.  ·	Diabetic foot ulcer with necrosis of bone right  ·	Acute OM of right foot  ·	Diabetes with foot ulcer  ·	PVD with gangrene.    ·	Diabetes with neuropathy    Plan  ·	Patient is seen and evaluated at bed side in [facility].   ·	Ulcer side flushed with betadine and NSS  ·	Betadine dsd applied to right foot  ·	Patient will need amputation of toe with debridement of infected soft tissue and bone with bone biopsy. will schedule early this week.   ·	Vascular consult recommended for evaluation of Peripheral vascular status for healing potential.   ·	Continue with IV antibiotics as per ID recommendations  ·	reviewed x-ray madison changes consistent with possible OM.   ·	DVT prophylaxis and pain management as per Medical team  ·	Podiatry will follow while he is in the hospital.     Thank you for this consult Please call with any questions.

## 2022-05-29 NOTE — ED PROVIDER NOTE - NS ED ROS FT
Constitutional: no fever, no chills  Head: NC, AT   Eyes: no redness   ENMT: no nasal congestion/drainage, no sore throat   CV: no chest pain, no edema  Resp: no cough, no dyspnea  GI: no abdominal pain, no nausea, no vomiting, no diarrhea  : no dysuria, no hematuria   Skin: + lesions, no rashes   Neuro: no LOC, no headache, no sensory deficits, no weakness

## 2022-05-29 NOTE — H&P ADULT - HISTORY OF PRESENT ILLNESS
41 y/o M with PMHx IDDM who presents to the ED c/o right 5th toe infection. Patient noted infection about 1.5 months ago, reports that the toe was turning black wiht associated pain. Saw you physician in [facility] who gave him a cream as well as PO antibiotics (3rd generation cephalosporin and Levaquin), which he has been taking for about a month. The wound was not healing properly so he went to a clinic and was informed to come to the ED for further care. Has chronic LE neuropathy. Denies fever, chill, CP, SOB, n/v/c/d nor urinary concerns.     ED vials stable, labs with elevated ESR/CRP, s/p Vanc and Zosyn, admitted for IV antibiotics.

## 2022-05-29 NOTE — ED PROVIDER NOTE - PROGRESS NOTE DETAILS
Will obtain labs, esr, crp, xrays of foot, give abx, and consult podiatry. - anamaria Workup unremarkable, podiatry recommending admission for IV abx and further management.- pinkhasov

## 2022-05-29 NOTE — ED PROVIDER NOTE - PHYSICAL EXAMINATION
General: well appearing, NAD  Head: NC, AT  EENT: no scleral icterus  Cardiac: RRR, no apparent murmurs, no lower extremity edema  Respiratory: CTABL, no respiratory distress   Abdomen: soft, ND, NT, nonperitonitic  MSK/Vascular: R 5th digit dark discoloration with ttp and no active pus drainage, soft compartments, warm extremities  Neuro: AAOx3, sensation to light touch intact  Psych: calm, cooperative

## 2022-05-29 NOTE — ED PROVIDER NOTE - OBJECTIVE STATEMENT
43 y/o M with PMHx DM who presents to the ED c/o right 5th toe infection. Patient states that his right 5th toe has been turning black and becoming painful for the past month. He states that he saw a doctor in [facility] who gave him a cream to put on his toe. He states that over the past month the symptoms have not subsided so he sought medical care at a clinic 2 days ago and was told to come to the ED for further evaluation. Denies any discharge. Denies any fevers, chills, chest pain, shortness of breath, NVD, abdominal pain or dysuria.

## 2022-05-29 NOTE — ED ADULT TRIAGE NOTE - CHIEF COMPLAINT QUOTE
Patient arrived to ED today with c/o infection to his right foot for the past month.  Patient was sent from the clinic to have wound evaluated.

## 2022-05-29 NOTE — H&P ADULT - NSHPPHYSICALEXAM_GEN_ALL_CORE
VITALS:   T(C): 37.5 (05-29-22 @ 15:39), Max: 37.5 (05-29-22 @ 15:39)  HR: 85 (05-29-22 @ 15:39) (77 - 89)  BP: 115/75 (05-29-22 @ 15:39) (115/75 - 123/80)  RR: 18 (05-29-22 @ 15:39) (16 - 18)  SpO2: 98% (05-29-22 @ 15:39) (98% - 100%)    GENERAL: NAD, lying in bed comfortably  HEAD:  Atraumatic, Normocephalic  EYES: EOMI, PERRLA, conjunctiva and sclera clear  ENT: Moist mucous membranes  NECK: Supple, No JVD  CHEST/LUNG: Clear to auscultation bilaterally; No rales, rhonchi, wheezing, or rubs. Unlabored respirations  HEART: Regular rate and rhythm; No murmurs, rubs, or gallops  ABDOMEN: BSx4; Soft, nontender, nondistended  EXTREMITIES:  2+ Peripheral Pulses, brisk capillary refill. No clubbing, cyanosis, or edema  NERVOUS SYSTEM:  A&Ox3, no focal deficits   SKIN: right inner fifth toe with eschar and pus drainage noted  PSYCH: Normal affect, euthymic mood

## 2022-05-29 NOTE — PATIENT PROFILE ADULT - FALL HARM RISK - HARM RISK INTERVENTIONS

## 2022-05-29 NOTE — ED ADULT NURSE NOTE - OBJECTIVE STATEMENT
pt reports "I have a diabetic infection to my right foot on the toe." pt has black escar to 5th digit of right foot and malodorous smell. + and = peripheral pulses.

## 2022-05-29 NOTE — H&P ADULT - NSHPREVIEWOFSYSTEMS_GEN_ALL_CORE
REVIEW OF SYSTEMS:    CONSTITUTIONAL: No weakness, fevers or chills  EYES: No vertigo or throat pain  ENT: No visual changes, eye pain  MOUTH: moist luke mucosal, no mouth ulcers  NECK: No pain or stiffness  RESPIRATORY: No cough, wheezing, hemoptysis; No shortness of breath  CARDIOVASCULAR: No chest pain or palpitations  GASTROINTESTINAL: No abdominal or epigastric pain. No nausea, vomiting, or hematemesis; No diarrhea or constipation. No melena or hematochezia.  GENITOURINARY: No dysuria, frequency or hematuria  NEUROLOGICAL: No numbness or weakness  SKIN: No itching, + Right toe infection  PSYCH: No anxiety or depression

## 2022-05-30 DIAGNOSIS — E11.621 TYPE 2 DIABETES MELLITUS WITH FOOT ULCER: ICD-10-CM

## 2022-05-30 LAB
A1C WITH ESTIMATED AVERAGE GLUCOSE RESULT: 9.3 % — HIGH (ref 4–5.6)
ABO RH CONFIRMATION: SIGNIFICANT CHANGE UP
ANION GAP SERPL CALC-SCNC: 10 MMOL/L — SIGNIFICANT CHANGE UP (ref 5–17)
BUN SERPL-MCNC: 16.8 MG/DL — SIGNIFICANT CHANGE UP (ref 8–20)
CALCIUM SERPL-MCNC: 8.8 MG/DL — SIGNIFICANT CHANGE UP (ref 8.6–10.2)
CHLORIDE SERPL-SCNC: 104 MMOL/L — SIGNIFICANT CHANGE UP (ref 98–107)
CO2 SERPL-SCNC: 25 MMOL/L — SIGNIFICANT CHANGE UP (ref 22–29)
CREAT SERPL-MCNC: 1.12 MG/DL — SIGNIFICANT CHANGE UP (ref 0.5–1.3)
EGFR: 84 ML/MIN/1.73M2 — SIGNIFICANT CHANGE UP
ESTIMATED AVERAGE GLUCOSE: 220 MG/DL — HIGH (ref 68–114)
GLUCOSE BLDC GLUCOMTR-MCNC: 111 MG/DL — HIGH (ref 70–99)
GLUCOSE BLDC GLUCOMTR-MCNC: 129 MG/DL — HIGH (ref 70–99)
GLUCOSE BLDC GLUCOMTR-MCNC: 132 MG/DL — HIGH (ref 70–99)
GLUCOSE BLDC GLUCOMTR-MCNC: 88 MG/DL — SIGNIFICANT CHANGE UP (ref 70–99)
GLUCOSE SERPL-MCNC: 90 MG/DL — SIGNIFICANT CHANGE UP (ref 70–99)
HCT VFR BLD CALC: 35 % — LOW (ref 39–50)
HGB BLD-MCNC: 11.2 G/DL — LOW (ref 13–17)
MAGNESIUM SERPL-MCNC: 1.7 MG/DL — LOW (ref 1.8–2.6)
MCHC RBC-ENTMCNC: 28.6 PG — SIGNIFICANT CHANGE UP (ref 27–34)
MCHC RBC-ENTMCNC: 32 GM/DL — SIGNIFICANT CHANGE UP (ref 32–36)
MCV RBC AUTO: 89.3 FL — SIGNIFICANT CHANGE UP (ref 80–100)
PHOSPHATE SERPL-MCNC: 4 MG/DL — SIGNIFICANT CHANGE UP (ref 2.4–4.7)
PLATELET # BLD AUTO: 346 K/UL — SIGNIFICANT CHANGE UP (ref 150–400)
POTASSIUM SERPL-MCNC: 3.9 MMOL/L — SIGNIFICANT CHANGE UP (ref 3.5–5.3)
POTASSIUM SERPL-SCNC: 3.9 MMOL/L — SIGNIFICANT CHANGE UP (ref 3.5–5.3)
RBC # BLD: 3.92 M/UL — LOW (ref 4.2–5.8)
RBC # FLD: 13.6 % — SIGNIFICANT CHANGE UP (ref 10.3–14.5)
SODIUM SERPL-SCNC: 139 MMOL/L — SIGNIFICANT CHANGE UP (ref 135–145)
VANCOMYCIN TROUGH SERPL-MCNC: 21.2 UG/ML — HIGH (ref 10–20)
WBC # BLD: 10.19 K/UL — SIGNIFICANT CHANGE UP (ref 3.8–10.5)
WBC # FLD AUTO: 10.19 K/UL — SIGNIFICANT CHANGE UP (ref 3.8–10.5)

## 2022-05-30 PROCEDURE — 99222 1ST HOSP IP/OBS MODERATE 55: CPT

## 2022-05-30 PROCEDURE — 99222 1ST HOSP IP/OBS MODERATE 55: CPT | Mod: GC

## 2022-05-30 PROCEDURE — 99233 SBSQ HOSP IP/OBS HIGH 50: CPT

## 2022-05-30 PROCEDURE — 99223 1ST HOSP IP/OBS HIGH 75: CPT

## 2022-05-30 PROCEDURE — 73718 MRI LOWER EXTREMITY W/O DYE: CPT | Mod: 26,RT

## 2022-05-30 RX ORDER — VANCOMYCIN HCL 1 G
1000 VIAL (EA) INTRAVENOUS EVERY 12 HOURS
Refills: 0 | Status: DISCONTINUED | OUTPATIENT
Start: 2022-05-31 | End: 2022-05-31

## 2022-05-30 RX ORDER — MAGNESIUM SULFATE 500 MG/ML
1 VIAL (ML) INJECTION ONCE
Refills: 0 | Status: COMPLETED | OUTPATIENT
Start: 2022-05-30 | End: 2022-05-30

## 2022-05-30 RX ADMIN — Medication 166.67 MILLIGRAM(S): at 10:07

## 2022-05-30 RX ADMIN — INSULIN GLARGINE 10 UNIT(S): 100 INJECTION, SOLUTION SUBCUTANEOUS at 22:08

## 2022-05-30 RX ADMIN — Medication 100 GRAM(S): at 08:03

## 2022-05-30 RX ADMIN — PIPERACILLIN AND TAZOBACTAM 25 GRAM(S): 4; .5 INJECTION, POWDER, LYOPHILIZED, FOR SOLUTION INTRAVENOUS at 14:51

## 2022-05-30 RX ADMIN — GABAPENTIN 300 MILLIGRAM(S): 400 CAPSULE ORAL at 22:08

## 2022-05-30 RX ADMIN — PIPERACILLIN AND TAZOBACTAM 25 GRAM(S): 4; .5 INJECTION, POWDER, LYOPHILIZED, FOR SOLUTION INTRAVENOUS at 05:17

## 2022-05-30 RX ADMIN — PIPERACILLIN AND TAZOBACTAM 25 GRAM(S): 4; .5 INJECTION, POWDER, LYOPHILIZED, FOR SOLUTION INTRAVENOUS at 22:08

## 2022-05-30 NOTE — CONSULT NOTE ADULT - ATTENDING COMMENTS
Patient with palpable pedal pulses and infected right 5th toe ulcer and osteomyelitis  No indication for further studies or interventions  Proceed with IV abx and podiatry evaluation

## 2022-05-30 NOTE — CONSULT NOTE ADULT - ASSESSMENT
T2DM   Insulin requiring    Cont current RX    needs DM educaiton   meter teach and insulin teach    Nutrition eval   expalined to pt importance of maiantianing good dietary habits, checking BS - needs good glcyemic control to heel the foot   limit alcoholic beverages to no more than 2 per day - and they are not cumulaitive     foot- managemnt perpodiatry

## 2022-05-30 NOTE — CONSULT NOTE ADULT - SUBJECTIVE AND OBJECTIVE BOX
HPI:   43 y/o M with PMHx IDDM who presents to the ED c/o right 5th toe infection. Patient noted infection about 1.5 months ago, reports that the toe was turning black wiht associated pain. Patient was seen by a physician in [facility] who gave him a cream as well as PO antibiotics (3rd generation cephalosporin and Levaquin), which he has been taking for about a month. The wound was not healing properly so he went to a clinic and was informed to come to the ED for further care. Podiatry was consulted to evaluate right foot ulcer    PAST MEDICAL & SURGICAL HISTORY:  Diabetes  H/O hand surgery    Social History:  Denies smoking/other drugs  drinks heavily on the weekend. Last drink was > 1 month ago (29 May 2022 17:11)    FAMILY HISTORY:  No pertinent family history in first degree relatives    Allergies  No Known Allergies    MEDICATIONS  (STANDING):  dextrose 5%. 1000 milliLiter(s) (50 mL/Hr) IV Continuous <Continuous>  dextrose 5%. 1000 milliLiter(s) (100 mL/Hr) IV Continuous <Continuous>  dextrose 50% Injectable 25 Gram(s) IV Push once  dextrose 50% Injectable 12.5 Gram(s) IV Push once  dextrose 50% Injectable 25 Gram(s) IV Push once  gabapentin 300 milliGRAM(s) Oral at bedtime  glucagon  Injectable 1 milliGRAM(s) IntraMuscular once  insulin glargine Injectable (LANTUS) 10 Unit(s) SubCutaneous at bedtime  insulin lispro (ADMELOG) corrective regimen sliding scale   SubCutaneous Before meals and at bedtime  piperacillin/tazobactam IVPB.. 3.375 Gram(s) IV Intermittent every 8 hours  vancomycin  IVPB 1250 milliGRAM(s) IV Intermittent every 12 hours    MEDICATIONS  (PRN):  acetaminophen     Tablet .. 650 milliGRAM(s) Oral every 6 hours PRN Temp greater or equal to 38C (100.4F), Mild Pain (1 - 3)  aluminum hydroxide/magnesium hydroxide/simethicone Suspension 30 milliLiter(s) Oral every 4 hours PRN Dyspepsia  dextrose Oral Gel 15 Gram(s) Oral once PRN Blood Glucose LESS THAN 70 milliGRAM(s)/deciliter  melatonin 3 milliGRAM(s) Oral at bedtime PRN Insomnia  ondansetron Injectable 4 milliGRAM(s) IV Push every 8 hours PRN Nausea and/or Vomiting                          13.1   8.71  )-----------( 394      ( 29 May 2022 11:52 )             41.0   05-29    137  |  100  |  17.5  ----------------------------<  110<H>  4.1   |  25.0  |  1.15    Ca    9.1      29 May 2022 11:52    TPro  9.5<H>  /  Alb  4.2  /  TBili  0.3<L>  /  DBili  x   /  AST  31  /  ALT  25  /  AlkPhos  97  05-29    Vital Signs Last 24 Hrs  T(C): 37.5 (29 May 2022 15:39), Max: 37.5 (29 May 2022 15:39)  T(F): 99.5 (29 May 2022 15:39), Max: 99.5 (29 May 2022 15:39)  HR: 85 (29 May 2022 15:39) (77 - 89)  BP: 115/75 (29 May 2022 15:39) (115/75 - 123/80)  BP(mean): --  RR: 18 (29 May 2022 15:39) (16 - 18)  SpO2: 98% (29 May 2022 15:39) (98% - 100%)     Review of Systems:  Review of Systems: REVIEW OF SYSTEMS:    CONSTITUTIONAL: No weakness, fevers or chills  EYES: No vertigo or throat pain  ENT: No visual changes, eye pain  MOUTH: moist luke mucosal, no mouth ulcers  NECK: No pain or stiffness  RESPIRATORY: No cough, wheezing, hemoptysis; No shortness of breath  CARDIOVASCULAR: No chest pain or palpitations  GASTROINTESTINAL: No abdominal or epigastric pain. No nausea, vomiting, or hematemesis; No diarrhea or constipation. No melena or hematochezia.  GENITOURINARY: No dysuria, frequency or hematuria  NEUROLOGICAL: No numbness or weakness  SKIN: No itching, + Right toe infection  PSYCH: No anxiety or depression      Physical Exam  Vascular: non palpable pedal pulses to b/l feet. TG warm to touch right foot. CFT instant  Derm: There is full thickness ulceration noted to 4th IS and dorsal foot measured about 5oby5uve7.4cm with fibrotic base and macerated wound edges. There is bone and tendon exposed. 5th toe has necrotic changes with local erythema   ortho: Decreased ROM to b/l ankles. There is pain with palpation to 5th toe   Neuro: protective sensation is grossly diminished.     
                                           [facility]                                                INFECTIOUS DISEASES  =======================================================                               Guy Felipe MD#  Colton Estevez MD*                                     Lynnette Quan MD*    Zabrina Elena MD*            Diplomates American Board of Internal Medicine & Infectious Diseases                  # [place] Office - Appt - Tel  [phone] Fax [phone]                * [place] Office - Appt - Tel [phone] Fax [phone]                                  Hospital Consult line:  [phone]  =======================================================      [id number]  JERZY REKHA   HPI:  This  41 y/o M with DM on insulin who presents to the ED c/o right 5th toe infection. Patient noted infection about 1.5 months ago, reports that the toe was turning black with associated pain. Saw you physician, Dr. Ryan Bravo, Telefax [phone],  in [facility] who gave him a cream as well as PO antibiotics (3rd generation cephalosporin and Levaquin), which he has been taking for about a month. The wound was not healing properly so he went to a clinic and was informed to come to the ED for further care. Has chronic LE neuropathy. Denies fever, chill, CP, SOB, n/v/c/d nor urinary concerns.   ED vials stable, labs with elevated ESR/CRP, s/p Vanc and Zosyn, admitted for IV antibiotics.  (29 May 2022 17:11)    he has been treated in [facility], [place]   Patient brought a doctors note from [facility], which stated that he's been treated since April 22, 2022. Per the note, he had been treated with a mix of 3rd generation cephalosporin, Levaquin 750mg daily, and secnidazole.  he was also given topical Sulfrexal gel (Ketanserin) to be applied to the wound.     No fevers noted here.  he was started on Vanco and Zosyn here.  Podiatry was consulted, who recommended surgical amputation of the toe.   Blood culture x 2 sets were sent.     I have personally reviewed the labs and data; pertinent labs and data are listed in this note; please see below.   =======================================================  Past Medical & Surgical Hx:  =====================  PAST MEDICAL & SURGICAL HISTORY:  Diabetes  H/O hand surgery    Problem List:  ==========  HEALTH ISSUES - PROBLEM Dx:    Social Hx:  =======  no toxic habits currently    FAMILY HISTORY:  No pertinent family history in first degree relatives    no significant family history of immunosuppressive disorders in mother or father   =======================================================    REVIEW OF SYSTEMS:  CONSTITUTIONAL:  No Fever or chills  HEENT:  No diplopia or blurred vision.  No earache, sore throat or runny nose.  CARDIOVASCULAR:  No pressure, squeezing, strangling, tightness, heaviness or aching about the chest, neck, axilla or epigastrium.  RESPIRATORY:  No cough, shortness of breath  GASTROINTESTINAL:  No nausea, vomiting or diarrhea.  GENITOURINARY:  No dysuria, frequency or urgency. No Blood in urine  MUSCULOSKELETAL:   as per HPI  SKIN:   as per HPI  NEUROLOGIC:  No Headaches, seizures or weakness.  PSYCHIATRIC:  No disorder of thought or mood.  ENDOCRINE:  No heat or cold intolerance  HEMATOLOGICAL:  No easy bruising or bleeding.    =======================================================  Allergies  No Known Allergies    Antibiotics:  piperacillin/tazobactam IVPB.. 3.375 Gram(s) IV Intermittent every 8 hours  vancomycin  IVPB 1250 milliGRAM(s) IV Intermittent every 12 hours    Other medications:  dextrose 5%. 1000 milliLiter(s) IV Continuous <Continuous>  dextrose 5%. 1000 milliLiter(s) IV Continuous <Continuous>  dextrose 50% Injectable 25 Gram(s) IV Push once  dextrose 50% Injectable 12.5 Gram(s) IV Push once  dextrose 50% Injectable 25 Gram(s) IV Push once  gabapentin 300 milliGRAM(s) Oral at bedtime  glucagon  Injectable 1 milliGRAM(s) IntraMuscular once  insulin glargine Injectable (LANTUS) 10 Unit(s) SubCutaneous at bedtime  insulin lispro (ADMELOG) corrective regimen sliding scale   SubCutaneous Before meals and at bedtime      piperacillin/tazobactam IVPB...   200 mL/Hr IV Intermittent (05-29-22 @ 11:42)   25 mL/Hr IV Intermittent (05-29-22 @ 21:23)   25 mL/Hr IV Intermittent (05-30-22 @ 05:17)      vancomycin  IVPB   166.67 mL/Hr IV Intermittent (05-29-22 @ 22:40)   250 mL/Hr IV Intermittent (05-29-22 @ 12:25)      ======================================================  Physical Exam:  ============  T(F): 98.8 (30 May 2022 08:37), Max: 99.5 (29 May 2022 15:39)  HR: 75 (30 May 2022 08:37)  BP: 155/76 (30 May 2022 08:37)  RR: 18 (30 May 2022 08:37)  SpO2: 98% (30 May 2022 08:37) (96% - 100%)  temp max in last 48H T(F): , Max: 99.5 (05-29-22 @ 15:39)    General:  No acute distress.  Eye: Pupils are equal, round and reactive to light, Extraocular movements are intact, Normal conjunctiva.  HENT: Normocephalic, Oral mucosa is moist, No pharyngeal erythema, No sinus tenderness.  Neck: Supple, No lymphadenopathy.  Respiratory: Lungs are clear to auscultation, Respirations are non-labored.  Cardiovascular: Normal rate, Regular rhythm,   Gastrointestinal: Soft, Non-tender, Non-distended, Normal bowel sounds.  Genitourinary: No costovertebral angle tenderness.  Lymphatics: No lymphadenopathy neck,   Musculoskeletal: Normal range of motion, Normal strength.  Integumentary:  ULCER in the RIGHT foot between the 4th and 5th toe.  FOUL smelling drainage.   RIGHT fifth toe tip is necrotic.   Neurologic: Alert, Oriented, No focal deficits, Cranial Nerves II-XII are grossly intact.  Psychiatric: Appropriate mood & affect.    =======================================================  Labs:                        11.2   10.19 )-----------( 346      ( 30 May 2022 04:18 )             35.0     05-30    139  |  104  |  16.8  ----------------------------<  90  3.9   |  25.0  |  1.12    Ca    8.8      30 May 2022 04:18  Phos  4.0     05-30  Mg     1.7     05-30    TPro  9.5<H>  /  Alb  4.2  /  TBili  0.3<L>  /  DBili  x   /  AST  31  /  ALT  25  /  AlkPhos  97  05-29      Creatinine, Serum: 1.12 mg/dL (05-30-22 @ 04:18)  Creatinine, Serum: 1.15 mg/dL (05-29-22 @ 11:52)    C-Reactive Protein, Serum: 17 mg/L (05-29-22 @ 11:52)    Sedimentation Rate, Erythrocyte: 31 mm/hr (05-29-22 @ 11:52)      SARS-CoV-2 Result: NotDetec (05-29-22 @ 11:54)         
Vascular Attending:  edward      HPI:   41 y/o M with PMHx IDDM who presents to the ED c/o right 5th toe infection. Patient noted infection about 1.5 months ago, reports that the toe was turning black wiht associated pain. Saw you physician in [facility] who gave him a cream as well as PO antibiotics (3rd generation cephalosporin and Levaquin), which he has been taking for about a month. The wound was not healing properly so he went to a clinic and was informed to come to the ED for further care. Has chronic LE neuropathy. Denies fever, chill, CP, SOB, n/v/c/d nor urinary concerns.     Vascular surgery consulted at this time for plan for amputation of the osteo of right 5th toe with concern for perfusion to that extremity. Per patient, had wound on base of right 5th toe that was present 1 month ago which has gotten progressively worse. He was formerly on insulin but stopped taking it. Denies foot pain, has no signs of claudication. No fevers, chills. no chest pain or shortness of breath. No cardiac history per patient      PAST MEDICAL & SURGICAL HISTORY:  Diabetes      H/O hand surgery          MEDICATIONS  (STANDING):  dextrose 5%. 1000 milliLiter(s) (50 mL/Hr) IV Continuous <Continuous>  dextrose 5%. 1000 milliLiter(s) (100 mL/Hr) IV Continuous <Continuous>  dextrose 50% Injectable 25 Gram(s) IV Push once  dextrose 50% Injectable 12.5 Gram(s) IV Push once  dextrose 50% Injectable 25 Gram(s) IV Push once  gabapentin 300 milliGRAM(s) Oral at bedtime  glucagon  Injectable 1 milliGRAM(s) IntraMuscular once  insulin glargine Injectable (LANTUS) 10 Unit(s) SubCutaneous at bedtime  insulin lispro (ADMELOG) corrective regimen sliding scale   SubCutaneous Before meals and at bedtime  piperacillin/tazobactam IVPB.. 3.375 Gram(s) IV Intermittent every 8 hours  vancomycin  IVPB 1250 milliGRAM(s) IV Intermittent every 12 hours    MEDICATIONS  (PRN):  acetaminophen     Tablet .. 650 milliGRAM(s) Oral every 6 hours PRN Temp greater or equal to 38C (100.4F), Mild Pain (1 - 3)  aluminum hydroxide/magnesium hydroxide/simethicone Suspension 30 milliLiter(s) Oral every 4 hours PRN Dyspepsia  dextrose Oral Gel 15 Gram(s) Oral once PRN Blood Glucose LESS THAN 70 milliGRAM(s)/deciliter  melatonin 3 milliGRAM(s) Oral at bedtime PRN Insomnia  ondansetron Injectable 4 milliGRAM(s) IV Push every 8 hours PRN Nausea and/or Vomiting      Allergies    No Known Allergies    Intolerances        SOCIAL HISTORY:    FAMILY HISTORY:  No pertinent family history in first degree relatives        Vital Signs Last 24 Hrs  T(C): 37 (30 May 2022 11:49), Max: 37.5 (29 May 2022 15:39)  T(F): 98.6 (30 May 2022 11:49), Max: 99.5 (29 May 2022 15:39)  HR: 83 (30 May 2022 11:49) (75 - 85)  BP: 117/76 (30 May 2022 11:49) (100/68 - 155/76)  BP(mean): --  RR: 18 (30 May 2022 11:49) (18 - 18)  SpO2: 98% (30 May 2022 11:49) (96% - 98%)    PHYSICAL EXAM:  GEN: NAD, laying in bed, appears stated age  HEENT: NCAT, clear oral mucosa, normal conjunctiva  Chest: non-tender  CV:  non-tachycardic, 2+ radial pulse  Pulm: no increased work of breathing, no conversational dyspnea  GI: soft, non-tender  MSK: moving all extremities   Vasc:   2+ pulses to bilateral femoral, DP, PT        LABS:                        11.2   10.19 )-----------( 346      ( 30 May 2022 04:18 )             35.0     05-30    139  |  104  |  16.8  ----------------------------<  90  3.9   |  25.0  |  1.12    Ca    8.8      30 May 2022 04:18  Phos  4.0     05-30  Mg     1.7     05-30    TPro  9.5<H>  /  Alb  4.2  /  TBili  0.3<L>  /  DBili  x   /  AST  31  /  ALT  25  /  AlkPhos  97  05-29    PT/INR - ( 29 May 2022 11:52 )   PT: 13.2 sec;   INR: 1.14 ratio         PTT - ( 29 May 2022 11:52 )  PTT:36.0 sec      RADIOLOGY & ADDITIONAL STUDIES
HPI:   41 y/o M with PMHx IDDM who presents to the ED c/o right 5th toe infection. Patient noted infection about 1.5 months ago, reports that the toe was turning black wiht associated pain. Saw physician in [facility] who gave him a cream as well as PO antibiotics (3rd generation cephalosporin and Levaquin), which he has been taking for about a month.  Pt also was found at that time to have BS over 700  The wound was not healing properly so he went to a clinic and was informed to come to the ED for further care. Has chronic LE neuropathy. Denies fever, chill, CP, SOB, n/v/c/d nor urinary concerns.         PAST MEDICAL & SURGICAL HISTORY:  Diabetes x 8 years   not testing BS   not adherent tot diet   was taking 60 unit NPH qhs ?   + site rotaton   tried MFN in past but gave GI upset     H/O hand surgery          FAMILY HISTORY:  sister PReDM         SOCIAL HISTORY: regi villalba raised in [facility]    No expsoure to chem or XRT    + weekend Beer- 6-10 beers  for the whole weekend   none during the week    no smoking     REVIEW OF SYSTEMS:    Constitutional: No fever, no chills, no change in weight.    Eyes: No eye swelling,no  blurry vision, no redness, no loss of vision.    Neck: No neck pain, no change in voice.    Lungs: No shortness of breath, no wheezing, no cough    CV: No chest pain, no palpitations, no pain with walking.    GI: No nausea, no vomiting, no constipation, no diarrhea, no abdominal pain    : No urinary frequency, no blood in urine, no urinary burning, no difficulty voiding.    Musculoskeletal: No muscle pain, no joint pain, no swelling.    Skin: right foot with infeciton  slight pain     Neurologic: No headaches, no weakness, no burning or pain in feet, no tremor.    Endocrine: No heat intolerance, no cold intolerance, no increased sweating, no shakiness between meals.    Psych: No depression, no anxiety, no trouble concentrating    MEDICATIONS  (STANDING):  dextrose 5%. 1000 milliLiter(s) (50 mL/Hr) IV Continuous <Continuous>  dextrose 5%. 1000 milliLiter(s) (100 mL/Hr) IV Continuous <Continuous>  dextrose 50% Injectable 25 Gram(s) IV Push once  dextrose 50% Injectable 12.5 Gram(s) IV Push once  dextrose 50% Injectable 25 Gram(s) IV Push once  gabapentin 300 milliGRAM(s) Oral at bedtime  glucagon  Injectable 1 milliGRAM(s) IntraMuscular once  insulin glargine Injectable (LANTUS) 10 Unit(s) SubCutaneous at bedtime  insulin lispro (ADMELOG) corrective regimen sliding scale   SubCutaneous Before meals and at bedtime  piperacillin/tazobactam IVPB.. 3.375 Gram(s) IV Intermittent every 8 hours  vancomycin  IVPB 1000 milliGRAM(s) IV Intermittent every 12 hours    MEDICATIONS  (PRN):  acetaminophen     Tablet .. 650 milliGRAM(s) Oral every 6 hours PRN Temp greater or equal to 38C (100.4F), Mild Pain (1 - 3)  aluminum hydroxide/magnesium hydroxide/simethicone Suspension 30 milliLiter(s) Oral every 4 hours PRN Dyspepsia  dextrose Oral Gel 15 Gram(s) Oral once PRN Blood Glucose LESS THAN 70 milliGRAM(s)/deciliter  melatonin 3 milliGRAM(s) Oral at bedtime PRN Insomnia  ondansetron Injectable 4 milliGRAM(s) IV Push every 8 hours PRN Nausea and/or Vomiting      Allergies    No Known Allergies    Intolerances          CAPILLARY BLOOD GLUCOSE      POCT Blood Glucose.: 129 mg/dL (30 May 2022 22:06)      PHYSICAL EXAM:    Vital Signs Last 24 Hrs  T(C): 37 (30 May 2022 20:45), Max: 37.4 (30 May 2022 16:13)  T(F): 98.6 (30 May 2022 20:45), Max: 99.4 (30 May 2022 16:13)  HR: 82 (30 May 2022 20:45) (75 - 94)  BP: 105/72 (30 May 2022 20:45) (105/72 - 155/76)  BP(mean): --  RR: 18 (30 May 2022 20:45) (18 - 18)  SpO2: 98% (30 May 2022 20:45) (98% - 99%)    General appearance: Well developed, well nourished.    Eyes: Pupils equal and reactive to light. EOM full. No exophthalmos.    Neck: Trachea midline. No thyroid enlargement.    Lungs: Normal respiratory excursion. Lungs clear.    CV: Regular cardiac rhythm. No murmur. Carotid and pedal pulses intact.    Abdomen: Soft, non tender, no organomegaly or mass.    Musculoskeletal: No cyanosis, clubbing, or edema. right foot bandaged     Skin: Warm and moist. No rash. No acanthosis.    Neuro: Cranial nerves intact. Normal motor and sensory function. DTR's normal.    Psych: Normal affect, good judgement.            LABS:                        11.2   10.19 )-----------( 346      ( 30 May 2022 04:18 )             35.0     05-30    139  |  104  |  16.8  ----------------------------<  90  3.9   |  25.0  |  1.12    Ca    8.8      30 May 2022 04:18  Phos  4.0     05-30  Mg     1.7     05-30    TPro  9.5<H>  /  Alb  4.2  /  TBili  0.3<L>  /  DBili  x   /  AST  31  /  ALT  25  /  AlkPhos  97  05-29      LIVER FUNCTIONS - ( 29 May 2022 11:52 )  Alb: 4.2 g/dL / Pro: 9.5 g/dL / ALK PHOS: 97 U/L / ALT: 25 U/L / AST: 31 U/L / GGT: x             A1C with Estimated Average Glucose Result: 9.3 % (05.30.22 @ 04:18)     CAPILLARY BLOOD GLUCOSE  CAPILLARY BLOOD GLUCOSE      POCT Blood Glucose.: 129 mg/dL (30 May 2022 22:06)  POCT Blood Glucose.: 111 mg/dL (30 May 2022 16:38)  POCT Blood Glucose.: 132 mg/dL (30 May 2022 11:15)  POCT Blood Glucose.: 88 mg/dL (30 May 2022 07:38)      RADIOLOGY & ADDITIONAL STUDIES:

## 2022-05-30 NOTE — CONSULT NOTE ADULT - ASSESSMENT
This  43 y/o M with DM on insulin who presents to the ED c/o right 5th toe infection. Patient noted infection about 1.5 months ago, reports that the toe was turning black with associated pain. Saw you physician, Dr. Ryan Bravo, Telefax [phone],  in [facility] who gave him a cream as well as PO antibiotics (3rd generation cephalosporin and Levaquin), which he has been taking for about a month. The wound was not healing properly so he went to a clinic and was informed to come to the ED for further care. Has chronic LE neuropathy. Denies fever, chill, CP, SOB, n/v/c/d nor urinary concerns.   ED vials stable, labs with elevated ESR/CRP, s/p Vanc and Zosyn, admitted for IV antibiotics.  (29 May 2022 17:11)    he has been treated in [facility], [place].   Patient brought a doctors note from [facility], which stated that he's been treated since April 22, 2022. Per the note, he had been treated with a mix of 3rd generation cephalosporin, Levaquin 750mg daily, and secnidazole.  he was also given topical Sulfrexal gel (Ketanserin) to be applied to the wound.     No fevers noted here.  he was started on Vanco and Zosyn here.  Podiatry was consulted, who recommended surgical amputation of the toe.   Blood culture x 2 sets were sent.       Impression:  diabetes in poor control  Right toe gangrene  foot infection    Plan:  - suspect polymicrobial infection  - continue zosyn and vancomycin  - blood cultures sent.   - follow up all outstanding cultures  - trend temperature and WBC curve  - repeat cultures from blood and all sources if febrile.      agree with plans for surgical resection.   please send OR CULTURES.     Please send pathology.    ESR not elevated, but chronicity is consistent with osteomyelitis.     Regarding Diabetes, poorly controlled  - needs adequate control for wound healing  - most recent A1c is: 9.3

## 2022-05-30 NOTE — CONSULT NOTE ADULT - ASSESSMENT
42M with osteo of right 5th digit with possible plan for amputation with podiatry. vascular consulted at this time to evaluate for PAD that would limit wound healing following amputation.   patient with palpable pulses and no history of peripheral artery diease. No intervention required. Cleared for vascular surgery to proceed with podiatry plan of care

## 2022-05-30 NOTE — PROGRESS NOTE ADULT - ASSESSMENT
43 y/o M with PMHx IDDM who presents to the ED c/o right 5th toe infection, admitted for toe OM.    #Toe osteomyelitis  - ESR and CRP elevated  - toe xray result pending  - c/w empiric Vanc and Zosyn  - f/u Vanc trough   - BCx pending   - podiatry consulted, poncho recs  - MRI foot and VA duplex of foot pending  - podiatry, ID and vascular consulted, poncho recs  - will need OR this coming week    #IDDM  - on home NPH 60U QD  - c/w home Gabapentin  - A1C 9.8, endo consulted  - Accuc checks, c/w Lantus 10U + ISS, up titrate as needed    DVT: Ambulate as tolerated, SCD  Diet: Consistent carb  Dispo: pending clinical course, will need OR and PT afterwards and likely home abx 43 y/o M with PMHx IDDM who presents to the ED c/o right 5th toe infection, admitted for toe OM.    #Toe osteomyelitis  - ESR and CRP elevated  - toe xray result pending  - c/w empiric Vanc and Zosyn  - f/u Vanc trough   - BCx pending   - podiatry consulted, poncho recs  - MRI foot and VA duplex of foot pending  - podiatry, ID and vascular consulted, poncho recs  - will need OR this coming week    #Anemia  - likely of chronic disease  - f/u AM CBC  - check iron, folate and B12    #IDDM  - on home NPH 60U QD  - c/w home Gabapentin  - A1C 9.8, endo consulted  - Accuc checks, c/w Lantus 10U + ISS, up titrate as needed    DVT: Ambulate as tolerated, SCD  Diet: Consistent carb  Dispo: pending clinical course, will need OR and PT afterwards and likely home abx

## 2022-05-30 NOTE — PROGRESS NOTE ADULT - ASSESSMENT
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    ·	Diabetes with neuropathy    Plan  ·	Patient is seen and evaluated at bed side in [facility].   ·	Ulcer side flushed with betadine and NSS  ·	Betadine dsd applied to right foot  ·	Patient will need amputation of toe with debridement of infected soft tissue and bone with bone biopsy. will schedule early this week pending MRI results and Vascular recommendations   ·	Continue with IV antibiotics as per ID recommendations  ·	reviewed x-ray [place] changes consistent with possible OM.   ·	DVT prophylaxis and pain management as per Medical team  ·	Podiatry will follow while he is in the hospital.

## 2022-05-30 NOTE — CHART NOTE - NSCHARTNOTEFT_GEN_A_CORE
Vascular consult called as per Dr. Howard. Patient to have amputation Right Vascular consult called as per Dr. Howard. Patient to have  Right 5th toe resection. Vascular consult obtained to evaluate healing process post amputation. Vascular consult called as per Dr. Howard. Patient to have  Right 5th toe resection. Vascular consult obtained to evaluate projected healing process post amputation.

## 2022-05-31 LAB
ANION GAP SERPL CALC-SCNC: 10 MMOL/L — SIGNIFICANT CHANGE UP (ref 5–17)
BUN SERPL-MCNC: 19.3 MG/DL — SIGNIFICANT CHANGE UP (ref 8–20)
CALCIUM SERPL-MCNC: 8.8 MG/DL — SIGNIFICANT CHANGE UP (ref 8.6–10.2)
CHLORIDE SERPL-SCNC: 101 MMOL/L — SIGNIFICANT CHANGE UP (ref 98–107)
CO2 SERPL-SCNC: 25 MMOL/L — SIGNIFICANT CHANGE UP (ref 22–29)
CREAT SERPL-MCNC: 1.39 MG/DL — HIGH (ref 0.5–1.3)
EGFR: 65 ML/MIN/1.73M2 — SIGNIFICANT CHANGE UP
FERRITIN SERPL-MCNC: 127 NG/ML — SIGNIFICANT CHANGE UP (ref 30–400)
FOLATE SERPL-MCNC: 7.5 NG/ML — SIGNIFICANT CHANGE UP
GLUCOSE BLDC GLUCOMTR-MCNC: 123 MG/DL — HIGH (ref 70–99)
GLUCOSE BLDC GLUCOMTR-MCNC: 134 MG/DL — HIGH (ref 70–99)
GLUCOSE BLDC GLUCOMTR-MCNC: 142 MG/DL — HIGH (ref 70–99)
GLUCOSE BLDC GLUCOMTR-MCNC: 154 MG/DL — HIGH (ref 70–99)
GLUCOSE SERPL-MCNC: 107 MG/DL — HIGH (ref 70–99)
HCT VFR BLD CALC: 34.6 % — LOW (ref 39–50)
HGB BLD-MCNC: 11 G/DL — LOW (ref 13–17)
IRON SATN MFR SERPL: 10 % — LOW (ref 16–55)
IRON SATN MFR SERPL: 33 UG/DL — LOW (ref 59–158)
MCHC RBC-ENTMCNC: 28.6 PG — SIGNIFICANT CHANGE UP (ref 27–34)
MCHC RBC-ENTMCNC: 31.8 GM/DL — LOW (ref 32–36)
MCV RBC AUTO: 89.9 FL — SIGNIFICANT CHANGE UP (ref 80–100)
PLATELET # BLD AUTO: 346 K/UL — SIGNIFICANT CHANGE UP (ref 150–400)
POTASSIUM SERPL-MCNC: 3.6 MMOL/L — SIGNIFICANT CHANGE UP (ref 3.5–5.3)
POTASSIUM SERPL-SCNC: 3.6 MMOL/L — SIGNIFICANT CHANGE UP (ref 3.5–5.3)
RBC # BLD: 3.85 M/UL — LOW (ref 4.2–5.8)
RBC # FLD: 13.6 % — SIGNIFICANT CHANGE UP (ref 10.3–14.5)
SARS-COV-2 RNA SPEC QL NAA+PROBE: SIGNIFICANT CHANGE UP
SODIUM SERPL-SCNC: 136 MMOL/L — SIGNIFICANT CHANGE UP (ref 135–145)
TIBC SERPL-MCNC: 322 UG/DL — SIGNIFICANT CHANGE UP (ref 220–430)
TRANSFERRIN SERPL-MCNC: 225 MG/DL — SIGNIFICANT CHANGE UP (ref 180–329)
VANCOMYCIN TROUGH SERPL-MCNC: 12.9 UG/ML — SIGNIFICANT CHANGE UP (ref 10–20)
VIT B12 SERPL-MCNC: 158 PG/ML — LOW (ref 232–1245)
WBC # BLD: 8.57 K/UL — SIGNIFICANT CHANGE UP (ref 3.8–10.5)
WBC # FLD AUTO: 8.57 K/UL — SIGNIFICANT CHANGE UP (ref 3.8–10.5)

## 2022-05-31 PROCEDURE — 99233 SBSQ HOSP IP/OBS HIGH 50: CPT

## 2022-05-31 PROCEDURE — 99232 SBSQ HOSP IP/OBS MODERATE 35: CPT

## 2022-05-31 RX ORDER — VANCOMYCIN HCL 1 G
750 VIAL (EA) INTRAVENOUS EVERY 12 HOURS
Refills: 0 | Status: ACTIVE | OUTPATIENT
Start: 2022-05-31 | End: 2023-04-29

## 2022-05-31 RX ORDER — POTASSIUM CHLORIDE 20 MEQ
40 PACKET (EA) ORAL ONCE
Refills: 0 | Status: COMPLETED | OUTPATIENT
Start: 2022-05-31 | End: 2022-05-31

## 2022-05-31 RX ORDER — PREGABALIN 225 MG/1
1000 CAPSULE ORAL DAILY
Refills: 0 | Status: ACTIVE | OUTPATIENT
Start: 2022-05-31 | End: 2023-04-29

## 2022-05-31 RX ADMIN — Medication 250 MILLIGRAM(S): at 17:11

## 2022-05-31 RX ADMIN — Medication 1: at 21:36

## 2022-05-31 RX ADMIN — Medication 250 MILLIGRAM(S): at 05:45

## 2022-05-31 RX ADMIN — INSULIN GLARGINE 10 UNIT(S): 100 INJECTION, SOLUTION SUBCUTANEOUS at 21:35

## 2022-05-31 RX ADMIN — GABAPENTIN 300 MILLIGRAM(S): 400 CAPSULE ORAL at 21:35

## 2022-05-31 RX ADMIN — PIPERACILLIN AND TAZOBACTAM 25 GRAM(S): 4; .5 INJECTION, POWDER, LYOPHILIZED, FOR SOLUTION INTRAVENOUS at 15:15

## 2022-05-31 RX ADMIN — PIPERACILLIN AND TAZOBACTAM 25 GRAM(S): 4; .5 INJECTION, POWDER, LYOPHILIZED, FOR SOLUTION INTRAVENOUS at 21:35

## 2022-05-31 RX ADMIN — Medication 40 MILLIEQUIVALENT(S): at 10:30

## 2022-05-31 RX ADMIN — PREGABALIN 1000 MICROGRAM(S): 225 CAPSULE ORAL at 15:14

## 2022-05-31 RX ADMIN — PIPERACILLIN AND TAZOBACTAM 25 GRAM(S): 4; .5 INJECTION, POWDER, LYOPHILIZED, FOR SOLUTION INTRAVENOUS at 05:46

## 2022-05-31 NOTE — PROGRESS NOTE ADULT - ASSESSMENT
T2DM   COnt curretn RX  BVS under excellent control     Foot- for dressing rx to be done-  dw PMD -  also podiatry to followup

## 2022-05-31 NOTE — PROGRESS NOTE ADULT - ASSESSMENT
43 y/o M with PMHx IDDM who presents to the ED c/o right 5th toe infection, admitted for toe OM.    #Toe osteomyelitis  - ESR and CRP elevated  - toe xray result + OM  - c/w empiric Vanc and Zosyn  - f/u Vanc trough   - BCx pending   - podiatry consulted, poncho recs  - MRI foot result noted, +OM  - podiatry, ID and vascular consulted, poncho recs  - NPO mn plan for OR 6/1  - EKG NSR, RCRI score of 1 points, Class II Risk 6.0 %, 30-day risk of death, MI, or cardiac arrest, METS > 4, medically optimized for planned procedure    #Anemia  - likely of chronic disease  - f/u AM CBC  - check iron, folate and B12    #MATT  - suspect ATN from Vancomycin  - trend BMP    #IDDM  - on home NPH 60U QD  - c/w home Gabapentin  - A1C 9.8, endo consulted  - Accuc checks, c/w Lantus 10U + ISS, up titrate as needed    DVT: Ambulate as tolerated, SCD  Diet: Consistent carb  Dispo: pending OR 6/1, PT eval afterwards

## 2022-05-31 NOTE — PROGRESS NOTE ADULT - ASSESSMENT
This  43 y/o M with DM on insulin who presents to the ED c/o right 5th toe infection. Patient noted infection about 1.5 months ago, reports that the toe was turning black with associated pain. Saw you physician, Dr. Ryan Bravo, Telefax [phone],  in [facility] who gave him a cream as well as PO antibiotics (3rd generation cephalosporin and Levaquin), which he has been taking for about a month. The wound was not healing properly so he went to a clinic and was informed to come to the ED for further care. Has chronic LE neuropathy. Denies fever, chill, CP, SOB, n/v/c/d nor urinary concerns.   ED vials stable, labs with elevated ESR/CRP, s/p Vanc and Zosyn, admitted for IV antibiotics.  (29 May 2022 17:11)    he has been treated in [facility], [place] the [place]   Patient brought a doctors note from [facility], which stated that he's been treated since April 22, 2022. Per the note, he had been treated with a mix of 3rd generation cephalosporin, Levaquin 750mg daily, and secnidazole.  he was also given topical Sulfrexal gel (Ketanserin) to be applied to the wound.     No fevers noted here.  he was started on Vanco and Zosyn here.  Podiatry was consulted, who recommended surgical amputation of the toe.   Blood culture x 2 sets were sent.       Impression:  diabetes in poor control  Right toe gangrene  foot infection    Plan:  - suspect polymicrobial infection  - continue zosyn and vancomycin  - blood cultures sent.   - awaiting MRI results.         Check MRSA from nares      - follow up all outstanding cultures  - trend temperature and WBC curve  - repeat cultures from blood and all sources if febrile.      agree with plans for surgical resection.   please send OR CULTURES.     Please send pathology.    ESR not elevated, but chronicity is consistent with osteomyelitis.     Regarding Diabetes, poorly controlled  - needs adequate control for wound healing  - most recent A1c is: 9.3

## 2022-05-31 NOTE — PROGRESS NOTE ADULT - ASSESSMENT
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    ·	Diabetes with neuropathy    Plan  ·	Patient is seen and evaluated at bed side in [facility].   ·	Ulcer side flushed with betadine and NSS  ·	Betadine dsd applied to right foot  ·	MRI of the right foot reviewed. Official reading is pending  ·	Patient is booked for surgery this Thursday 6/2 at 12PM Debridement of infected soft tissue and bone, bone biopsy, partial 5th ray amputation right foot.    ·	NPO past midnight   ·	Medical clearance requested  ·	Vascular clearance noted.   ·	Please send new Covid swap in preparation to OR.   ·	Continue with IV antibiotics as per ID recommendations  ·	DVT prophylaxis and pain management as per Medical team  ·	Podiatry will follow while he is in the hospital.      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    ·	Diabetes with neuropathy    Plan  ·	Patient is seen and evaluated at bed side in [facility].   ·	Ulcer side flushed with betadine and NSS  ·	Betadine dsd applied to right foot  ·	MRI of the right foot reviewed.   ·	Positive OM of the 5th and 4th metatarsal with possible early OM of the 3rd metatarsal.   ·	Patient is booked for surgery this Thursday 6/2 at 12PM Debridement of infected soft tissue and bone, bone biopsy, partial 5th ray amputation right foot.    ·	NPO past midnight   ·	Medical clearance requested  ·	Vascular clearance noted.   ·	Please send new Covid swap in preparation to OR.   ·	Continue with IV antibiotics as per ID recommendations  ·	DVT prophylaxis and pain management as per Medical team  ·	Podiatry will follow while he is in the hospital.

## 2022-06-01 ENCOUNTER — TRANSCRIPTION ENCOUNTER (OUTPATIENT)
Age: 42
End: 2022-06-01

## 2022-06-01 LAB
ANION GAP SERPL CALC-SCNC: 11 MMOL/L — SIGNIFICANT CHANGE UP (ref 5–17)
APTT BLD: 32.3 SEC — SIGNIFICANT CHANGE UP (ref 27.5–35.5)
BLD GP AB SCN SERPL QL: SIGNIFICANT CHANGE UP
BUN SERPL-MCNC: 21.2 MG/DL — HIGH (ref 8–20)
CALCIUM SERPL-MCNC: 9 MG/DL — SIGNIFICANT CHANGE UP (ref 8.6–10.2)
CHLORIDE SERPL-SCNC: 103 MMOL/L — SIGNIFICANT CHANGE UP (ref 98–107)
CO2 SERPL-SCNC: 23 MMOL/L — SIGNIFICANT CHANGE UP (ref 22–29)
CREAT SERPL-MCNC: 1.39 MG/DL — HIGH (ref 0.5–1.3)
EGFR: 65 ML/MIN/1.73M2 — SIGNIFICANT CHANGE UP
GLUCOSE BLDC GLUCOMTR-MCNC: 112 MG/DL — HIGH (ref 70–99)
GLUCOSE BLDC GLUCOMTR-MCNC: 130 MG/DL — HIGH (ref 70–99)
GLUCOSE BLDC GLUCOMTR-MCNC: 141 MG/DL — HIGH (ref 70–99)
GLUCOSE BLDC GLUCOMTR-MCNC: 147 MG/DL — HIGH (ref 70–99)
GLUCOSE BLDC GLUCOMTR-MCNC: 152 MG/DL — HIGH (ref 70–99)
GLUCOSE SERPL-MCNC: 123 MG/DL — HIGH (ref 70–99)
HCT VFR BLD CALC: 37.7 % — LOW (ref 39–50)
HGB BLD-MCNC: 11.9 G/DL — LOW (ref 13–17)
INR BLD: 1.14 RATIO — SIGNIFICANT CHANGE UP (ref 0.88–1.16)
MAGNESIUM SERPL-MCNC: 1.8 MG/DL — SIGNIFICANT CHANGE UP (ref 1.6–2.6)
MCHC RBC-ENTMCNC: 28.4 PG — SIGNIFICANT CHANGE UP (ref 27–34)
MCHC RBC-ENTMCNC: 31.6 GM/DL — LOW (ref 32–36)
MCV RBC AUTO: 90 FL — SIGNIFICANT CHANGE UP (ref 80–100)
PHOSPHATE SERPL-MCNC: 3.5 MG/DL — SIGNIFICANT CHANGE UP (ref 2.4–4.7)
PLATELET # BLD AUTO: 375 K/UL — SIGNIFICANT CHANGE UP (ref 150–400)
POTASSIUM SERPL-MCNC: 4.2 MMOL/L — SIGNIFICANT CHANGE UP (ref 3.5–5.3)
POTASSIUM SERPL-SCNC: 4.2 MMOL/L — SIGNIFICANT CHANGE UP (ref 3.5–5.3)
PROTHROM AB SERPL-ACNC: 13.2 SEC — SIGNIFICANT CHANGE UP (ref 10.5–13.4)
RBC # BLD: 4.19 M/UL — LOW (ref 4.2–5.8)
RBC # FLD: 13.5 % — SIGNIFICANT CHANGE UP (ref 10.3–14.5)
SODIUM SERPL-SCNC: 136 MMOL/L — SIGNIFICANT CHANGE UP (ref 135–145)
VANCOMYCIN TROUGH SERPL-MCNC: 14.7 UG/ML — SIGNIFICANT CHANGE UP (ref 10–20)
WBC # BLD: 8.82 K/UL — SIGNIFICANT CHANGE UP (ref 3.8–10.5)
WBC # FLD AUTO: 8.82 K/UL — SIGNIFICANT CHANGE UP (ref 3.8–10.5)

## 2022-06-01 PROCEDURE — 99232 SBSQ HOSP IP/OBS MODERATE 35: CPT

## 2022-06-01 PROCEDURE — 99233 SBSQ HOSP IP/OBS HIGH 50: CPT

## 2022-06-01 RX ADMIN — Medication 250 MILLIGRAM(S): at 17:13

## 2022-06-01 RX ADMIN — GABAPENTIN 300 MILLIGRAM(S): 400 CAPSULE ORAL at 22:21

## 2022-06-01 RX ADMIN — PIPERACILLIN AND TAZOBACTAM 25 GRAM(S): 4; .5 INJECTION, POWDER, LYOPHILIZED, FOR SOLUTION INTRAVENOUS at 05:23

## 2022-06-01 RX ADMIN — PIPERACILLIN AND TAZOBACTAM 25 GRAM(S): 4; .5 INJECTION, POWDER, LYOPHILIZED, FOR SOLUTION INTRAVENOUS at 22:22

## 2022-06-01 RX ADMIN — INSULIN GLARGINE 10 UNIT(S): 100 INJECTION, SOLUTION SUBCUTANEOUS at 22:22

## 2022-06-01 RX ADMIN — Medication 250 MILLIGRAM(S): at 05:23

## 2022-06-01 NOTE — SBIRT NOTE ADULT - NSSBIRTALCPASSREFTXDET_GEN_A_CORE
Patient reported he will drink 6 to 10 beers per week. Patient reported his alcohol use is not an issue. SW offered alcohol abuse resources, patient declined. SW remains available should patient change his mind.

## 2022-06-01 NOTE — SBIRT NOTE ADULT - NSSBIRTSIXOCC_GEN_A_CORE
[facility], [place], Emergency Department    [address] [place] [id number]    Phone:  [phone]                                       Andrew Lockwood   MRN: [id number]    Department:  [facility], Emergency Department   Date of Visit:  9/27/2018           After Visit Summary Signature Page     I have received my discharge instructions, and my questions have been answered. I have discussed any challenges I see with this plan with the nurse or doctor.    ..........................................................................................................................................  Patient/Patient Representative Signature      ..........................................................................................................................................  Patient Representative Print Name and Relationship to Patient    ..................................................               ................................................  Date                                   Time    ..........................................................................................................................................  Reviewed by Signature/Title    ...................................................              ..............................................  Date                                               Time          22EPIC Rev 08/18         Less than monthly

## 2022-06-01 NOTE — PROGRESS NOTE ADULT - ASSESSMENT
41 y/o M with PMHx IDDM who presents to the ED c/o right 5th toe infection, admitted for toe OM.    #Toe osteomyelitis   - ESR and CRP elevated  - toe xray result + OM  - c/w empiric Vanc and Zosyn  - f/u Vanc trough   - BCx NGTD  - podiatry consulted, poncho recs  - MRI foot result noted, +OM  - podiatry, ID and vascular consulted, poncho recs  - NPO mn plan for OR 6/2  - EKG NSR, RCRI score of 1 points, Class II Risk 6.0 %, 30-day risk of death, MI, or cardiac arrest, METS > 4, medically optimized for planned procedure    #Anemia  - likely of chronic disease  - f/u AM CBC  - B12 low, start supplementation     #MATT - remains elevated  - suspect ATN from Vancomycin  - trend BMP  - if worsening, will consider hydration     #IDDM  - on home NPH 60U QD  - c/w home Gabapentin  - A1C 9.8, endo consulted  - Accuc checks, c/w Lantus 10U + ISS, up titrate as needed    DVT: Ambulate as tolerated, SCD  Diet: Consistent carb  Dispo: pending OR 6/2, PT eval afterwards

## 2022-06-01 NOTE — PROGRESS NOTE ADULT - ASSESSMENT
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    ·	Diabetes with neuropathy    Plan  ·	Patient is seen and evaluated at bed side in [facility].   ·	Ulcer side flushed with betadine and NSS  ·	Betadine dsd applied to right foot  ·	MRI of the right foot reviewed.   ·	Positive OM of the 5th and 4th metatarsal with possible early OM of the 3rd metatarsal.   ·	Patient is booked for surgery tomorrow  6/2 at 12PM Debridement of infected soft tissue and bone, bone biopsy, partial 5th ray amputation right foot.    ·	NPO past midnight   ·	Medical clearance requested  ·	Vascular clearance noted.   ·	Reviewed  new Covid result, Negative 5/31/22  ·	Continue with IV antibiotics as per ID recommendations  ·	DVT prophylaxis and pain management as per Medical team  ·	Podiatry will follow while he is in the hospital.

## 2022-06-01 NOTE — PROGRESS NOTE ADULT - ASSESSMENT
This  43 y/o M with DM on insulin who presents to the ED c/o right 5th toe infection. Patient noted infection about 1.5 months ago, reports that the toe was turning black with associated pain. Saw you physician, Dr. Ryan Bravo, Telefax [phone],  in [facility] who gave him a cream as well as PO antibiotics (3rd generation cephalosporin and Levaquin), which he has been taking for about a month. The wound was not healing properly so he went to a clinic and was informed to come to the ED for further care. Has chronic LE neuropathy. Denies fever, chill, CP, SOB, n/v/c/d nor urinary concerns.   ED vials stable, labs with elevated ESR/CRP, s/p Vanc and Zosyn, admitted for IV antibiotics.  (29 May 2022 17:11)    he has been treated in [facility], [place].   Patient brought a doctors note from [facility], which stated that he's been treated since April 22, 2022. Per the note, he had been treated with a mix of 3rd generation cephalosporin, Levaquin 750mg daily, and secnidazole.  he was also given topical Sulfrexal gel (Ketanserin) to be applied to the wound.     No fevers noted here.  he was started on Vanco and Zosyn here.  Podiatry was consulted, who recommended surgical amputation of the toe.   Blood culture x 2 sets were sent.       Impression:  diabetes in poor control  Right toe gangrene  Right foot osteomyelitis  foot infection    Plan:  - suspect polymicrobial infection  - continue zosyn and vancomycin  - blood cultures sent.   - MRI confirms osteomyelitis           agree with plans for surgical resection.  pending 6/2/22  please send OR CULTURES.   Please send pathology.      ESR not elevated, but chronicity is consistent with osteomyelitis.     Regarding Diabetes, poorly controlled  - needs adequate control for wound healing  - most recent A1c is: 9.3

## 2022-06-01 NOTE — SBIRT NOTE ADULT - NSSBIRTDRGPOSREINDET_GEN_A_CORE
Patient denies history of substance abuse and denies current substance abuse. SW offered substance abuse resources, patient declined. SW remains available should patient change their mind.

## 2022-06-02 ENCOUNTER — TRANSCRIPTION ENCOUNTER (OUTPATIENT)
Age: 42
End: 2022-06-02

## 2022-06-02 ENCOUNTER — RESULT REVIEW (OUTPATIENT)
Age: 42
End: 2022-06-02

## 2022-06-02 LAB
ANION GAP SERPL CALC-SCNC: 11 MMOL/L — SIGNIFICANT CHANGE UP (ref 5–17)
BASOPHILS # BLD AUTO: 0.07 K/UL — SIGNIFICANT CHANGE UP (ref 0–0.2)
BASOPHILS NFR BLD AUTO: 0.7 % — SIGNIFICANT CHANGE UP (ref 0–2)
BUN SERPL-MCNC: 23.2 MG/DL — HIGH (ref 8–20)
CALCIUM SERPL-MCNC: 9 MG/DL — SIGNIFICANT CHANGE UP (ref 8.6–10.2)
CHLORIDE SERPL-SCNC: 101 MMOL/L — SIGNIFICANT CHANGE UP (ref 98–107)
CO2 SERPL-SCNC: 24 MMOL/L — SIGNIFICANT CHANGE UP (ref 22–29)
CREAT SERPL-MCNC: 1.45 MG/DL — HIGH (ref 0.5–1.3)
EGFR: 61 ML/MIN/1.73M2 — SIGNIFICANT CHANGE UP
EOSINOPHIL # BLD AUTO: 0.21 K/UL — SIGNIFICANT CHANGE UP (ref 0–0.5)
EOSINOPHIL NFR BLD AUTO: 2.2 % — SIGNIFICANT CHANGE UP (ref 0–6)
GLUCOSE BLDC GLUCOMTR-MCNC: 107 MG/DL — HIGH (ref 70–99)
GLUCOSE BLDC GLUCOMTR-MCNC: 138 MG/DL — HIGH (ref 70–99)
GLUCOSE BLDC GLUCOMTR-MCNC: 162 MG/DL — HIGH (ref 70–99)
GLUCOSE BLDC GLUCOMTR-MCNC: 285 MG/DL — HIGH (ref 70–99)
GLUCOSE SERPL-MCNC: 115 MG/DL — HIGH (ref 70–99)
HCT VFR BLD CALC: 36 % — LOW (ref 39–50)
HGB BLD-MCNC: 11.3 G/DL — LOW (ref 13–17)
IMM GRANULOCYTES NFR BLD AUTO: 0.9 % — SIGNIFICANT CHANGE UP (ref 0–1.5)
LYMPHOCYTES # BLD AUTO: 3.41 K/UL — HIGH (ref 1–3.3)
LYMPHOCYTES # BLD AUTO: 35.6 % — SIGNIFICANT CHANGE UP (ref 13–44)
MAGNESIUM SERPL-MCNC: 1.8 MG/DL — SIGNIFICANT CHANGE UP (ref 1.6–2.6)
MCHC RBC-ENTMCNC: 28.5 PG — SIGNIFICANT CHANGE UP (ref 27–34)
MCHC RBC-ENTMCNC: 31.4 GM/DL — LOW (ref 32–36)
MCV RBC AUTO: 90.9 FL — SIGNIFICANT CHANGE UP (ref 80–100)
MONOCYTES # BLD AUTO: 0.78 K/UL — SIGNIFICANT CHANGE UP (ref 0–0.9)
MONOCYTES NFR BLD AUTO: 8.2 % — SIGNIFICANT CHANGE UP (ref 2–14)
NEUTROPHILS # BLD AUTO: 5.01 K/UL — SIGNIFICANT CHANGE UP (ref 1.8–7.4)
NEUTROPHILS NFR BLD AUTO: 52.4 % — SIGNIFICANT CHANGE UP (ref 43–77)
PHOSPHATE SERPL-MCNC: 3.4 MG/DL — SIGNIFICANT CHANGE UP (ref 2.4–4.7)
PLATELET # BLD AUTO: 382 K/UL — SIGNIFICANT CHANGE UP (ref 150–400)
POTASSIUM SERPL-MCNC: 4 MMOL/L — SIGNIFICANT CHANGE UP (ref 3.5–5.3)
POTASSIUM SERPL-SCNC: 4 MMOL/L — SIGNIFICANT CHANGE UP (ref 3.5–5.3)
RBC # BLD: 3.96 M/UL — LOW (ref 4.2–5.8)
RBC # FLD: 13.2 % — SIGNIFICANT CHANGE UP (ref 10.3–14.5)
SODIUM SERPL-SCNC: 136 MMOL/L — SIGNIFICANT CHANGE UP (ref 135–145)
VANCOMYCIN TROUGH SERPL-MCNC: 15.3 UG/ML — SIGNIFICANT CHANGE UP (ref 10–20)
WBC # BLD: 9.57 K/UL — SIGNIFICANT CHANGE UP (ref 3.8–10.5)
WBC # FLD AUTO: 9.57 K/UL — SIGNIFICANT CHANGE UP (ref 3.8–10.5)

## 2022-06-02 PROCEDURE — 88311 DECALCIFY TISSUE: CPT | Mod: 26

## 2022-06-02 PROCEDURE — 88305 TISSUE EXAM BY PATHOLOGIST: CPT | Mod: 26

## 2022-06-02 PROCEDURE — 99233 SBSQ HOSP IP/OBS HIGH 50: CPT

## 2022-06-02 PROCEDURE — 73630 X-RAY EXAM OF FOOT: CPT | Mod: 26,RT

## 2022-06-02 DEVICE — IMPLANTABLE DEVICE: Type: IMPLANTABLE DEVICE | Status: FUNCTIONAL

## 2022-06-02 RX ORDER — SODIUM CHLORIDE 9 MG/ML
1000 INJECTION, SOLUTION INTRAVENOUS
Refills: 0 | Status: DISCONTINUED | OUTPATIENT
Start: 2022-06-02 | End: 2022-06-08

## 2022-06-02 RX ORDER — GLUCAGON INJECTION, SOLUTION 0.5 MG/.1ML
1 INJECTION, SOLUTION SUBCUTANEOUS ONCE
Refills: 0 | Status: DISCONTINUED | OUTPATIENT
Start: 2022-06-02 | End: 2022-06-08

## 2022-06-02 RX ORDER — ONDANSETRON 8 MG/1
4 TABLET, FILM COATED ORAL ONCE
Refills: 0 | Status: DISCONTINUED | OUTPATIENT
Start: 2022-06-02 | End: 2022-06-02

## 2022-06-02 RX ORDER — ACETAMINOPHEN 500 MG
650 TABLET ORAL EVERY 6 HOURS
Refills: 0 | Status: DISCONTINUED | OUTPATIENT
Start: 2022-06-02 | End: 2022-06-08

## 2022-06-02 RX ORDER — DEXTROSE 50 % IN WATER 50 %
12.5 SYRINGE (ML) INTRAVENOUS ONCE
Refills: 0 | Status: DISCONTINUED | OUTPATIENT
Start: 2022-06-02 | End: 2022-06-08

## 2022-06-02 RX ORDER — INSULIN LISPRO 100/ML
3 VIAL (ML) SUBCUTANEOUS
Refills: 0 | Status: DISCONTINUED | OUTPATIENT
Start: 2022-06-02 | End: 2022-06-04

## 2022-06-02 RX ORDER — PREGABALIN 225 MG/1
1000 CAPSULE ORAL DAILY
Refills: 0 | Status: DISCONTINUED | OUTPATIENT
Start: 2022-06-02 | End: 2022-06-08

## 2022-06-02 RX ORDER — VANCOMYCIN HCL 1 G
750 VIAL (EA) INTRAVENOUS EVERY 12 HOURS
Refills: 0 | Status: DISCONTINUED | OUTPATIENT
Start: 2022-06-02 | End: 2022-06-03

## 2022-06-02 RX ORDER — ONDANSETRON 8 MG/1
4 TABLET, FILM COATED ORAL EVERY 8 HOURS
Refills: 0 | Status: DISCONTINUED | OUTPATIENT
Start: 2022-06-02 | End: 2022-06-08

## 2022-06-02 RX ORDER — INSULIN LISPRO 100/ML
VIAL (ML) SUBCUTANEOUS
Refills: 0 | Status: DISCONTINUED | OUTPATIENT
Start: 2022-06-02 | End: 2022-06-08

## 2022-06-02 RX ORDER — DEXTROSE 50 % IN WATER 50 %
25 SYRINGE (ML) INTRAVENOUS ONCE
Refills: 0 | Status: DISCONTINUED | OUTPATIENT
Start: 2022-06-02 | End: 2022-06-08

## 2022-06-02 RX ORDER — INSULIN GLARGINE 100 [IU]/ML
10 INJECTION, SOLUTION SUBCUTANEOUS AT BEDTIME
Refills: 0 | Status: DISCONTINUED | OUTPATIENT
Start: 2022-06-02 | End: 2022-06-04

## 2022-06-02 RX ORDER — HYDROMORPHONE HYDROCHLORIDE 2 MG/ML
0.5 INJECTION INTRAMUSCULAR; INTRAVENOUS; SUBCUTANEOUS
Refills: 0 | Status: DISCONTINUED | OUTPATIENT
Start: 2022-06-02 | End: 2022-06-02

## 2022-06-02 RX ORDER — DEXTROSE 50 % IN WATER 50 %
15 SYRINGE (ML) INTRAVENOUS ONCE
Refills: 0 | Status: DISCONTINUED | OUTPATIENT
Start: 2022-06-02 | End: 2022-06-08

## 2022-06-02 RX ORDER — LANOLIN ALCOHOL/MO/W.PET/CERES
3 CREAM (GRAM) TOPICAL AT BEDTIME
Refills: 0 | Status: DISCONTINUED | OUTPATIENT
Start: 2022-06-02 | End: 2022-06-08

## 2022-06-02 RX ORDER — SODIUM CHLORIDE 9 MG/ML
1000 INJECTION INTRAMUSCULAR; INTRAVENOUS; SUBCUTANEOUS ONCE
Refills: 0 | Status: COMPLETED | OUTPATIENT
Start: 2022-06-02 | End: 2022-06-02

## 2022-06-02 RX ORDER — PIPERACILLIN AND TAZOBACTAM 4; .5 G/20ML; G/20ML
3.38 INJECTION, POWDER, LYOPHILIZED, FOR SOLUTION INTRAVENOUS EVERY 8 HOURS
Refills: 0 | Status: DISCONTINUED | OUTPATIENT
Start: 2022-06-02 | End: 2022-06-06

## 2022-06-02 RX ORDER — GABAPENTIN 400 MG/1
300 CAPSULE ORAL AT BEDTIME
Refills: 0 | Status: DISCONTINUED | OUTPATIENT
Start: 2022-06-02 | End: 2022-06-08

## 2022-06-02 RX ORDER — INSULIN LISPRO 100/ML
3 VIAL (ML) SUBCUTANEOUS
Refills: 0 | Status: DISCONTINUED | OUTPATIENT
Start: 2022-06-02 | End: 2022-06-02

## 2022-06-02 RX ORDER — INSULIN LISPRO 100/ML
VIAL (ML) SUBCUTANEOUS
Refills: 0 | Status: DISCONTINUED | OUTPATIENT
Start: 2022-06-02 | End: 2022-06-02

## 2022-06-02 RX ORDER — INSULIN GLARGINE 100 [IU]/ML
10 INJECTION, SOLUTION SUBCUTANEOUS AT BEDTIME
Refills: 0 | Status: DISCONTINUED | OUTPATIENT
Start: 2022-06-02 | End: 2022-06-02

## 2022-06-02 RX ADMIN — PIPERACILLIN AND TAZOBACTAM 25 GRAM(S): 4; .5 INJECTION, POWDER, LYOPHILIZED, FOR SOLUTION INTRAVENOUS at 06:20

## 2022-06-02 RX ADMIN — Medication 1: at 17:06

## 2022-06-02 RX ADMIN — GABAPENTIN 300 MILLIGRAM(S): 400 CAPSULE ORAL at 22:38

## 2022-06-02 RX ADMIN — PREGABALIN 1000 MICROGRAM(S): 225 CAPSULE ORAL at 17:05

## 2022-06-02 RX ADMIN — Medication 3 UNIT(S): at 17:05

## 2022-06-02 RX ADMIN — PIPERACILLIN AND TAZOBACTAM 25 GRAM(S): 4; .5 INJECTION, POWDER, LYOPHILIZED, FOR SOLUTION INTRAVENOUS at 22:38

## 2022-06-02 RX ADMIN — Medication 3: at 22:43

## 2022-06-02 RX ADMIN — INSULIN GLARGINE 10 UNIT(S): 100 INJECTION, SOLUTION SUBCUTANEOUS at 23:39

## 2022-06-02 RX ADMIN — SODIUM CHLORIDE 1000 MILLILITER(S): 9 INJECTION INTRAMUSCULAR; INTRAVENOUS; SUBCUTANEOUS at 10:05

## 2022-06-02 RX ADMIN — Medication 250 MILLIGRAM(S): at 17:42

## 2022-06-02 RX ADMIN — Medication 250 MILLIGRAM(S): at 05:00

## 2022-06-02 RX ADMIN — PIPERACILLIN AND TAZOBACTAM 25 GRAM(S): 4; .5 INJECTION, POWDER, LYOPHILIZED, FOR SOLUTION INTRAVENOUS at 12:30

## 2022-06-02 NOTE — PROGRESS NOTE ADULT - ASSESSMENT
41 y/o M with PMHx IDDM who presents to the ED c/o right 5th toe infection, admitted for toe OM.    #Toe osteomyelitis   - ESR and CRP elevated  - toe xray result + OM  - c/w empiric Vanc and Zosyn  - f/u Vanc trough   - BCx NGTD  - podiatry consulted, poncho recs  - MRI foot result noted, +OM  - podiatry, ID and vascular consulted, poncho recs  - NPO mn plan for OR 6/2  - EKG NSR, RCRI score of 1 points, Class II Risk 6.0 %, 30-day risk of death, MI, or cardiac arrest, METS > 4, medically optimized for planned procedure    #MATT - worsening up to 1.45  - suspect ATN from Vancomycin  - trend BMP  - give 1L bolus     #Anemia  - likely of chronic disease  - daily CBC  - B12 low, start supplementation     #IDDM  - on home NPH 60U QD  - c/w home Gabapentin  - A1C 9.8, endo consulted  - Accuc checks, c/w Lantus 10U + ISS, up titrate as needed    DVT: Ambulate as tolerated, SCD  Diet: Consistent carb  Dispo: pending OR 6/2, PT eval afterwards

## 2022-06-02 NOTE — BRIEF OPERATIVE NOTE - NSICDXBRIEFPOSTOP_GEN_ALL_CORE_FT
POST-OP DIAGNOSIS:  Osteomyelitis of toe 02-Jun-2022 13:33:18 right 5th toe osteomyelitis Shahriar Quinteros

## 2022-06-02 NOTE — BRIEF OPERATIVE NOTE - NSICDXBRIEFPROCEDURE_GEN_ALL_CORE_FT
PROCEDURES:  Amputation, toe, right 02-Jun-2022 13:31:47 Amputation Right 5th toe Shahriar Quinteros

## 2022-06-02 NOTE — BRIEF OPERATIVE NOTE - NSICDXBRIEFPREOP_GEN_ALL_CORE_FT
PRE-OP DIAGNOSIS:  Osteomyelitis of toe 02-Jun-2022 13:32:11 right 5th toe osteomyelitis Shahriar Quinteros

## 2022-06-03 LAB
ALBUMIN SERPL ELPH-MCNC: 3.6 G/DL — SIGNIFICANT CHANGE UP (ref 3.3–5.2)
ALP SERPL-CCNC: 67 U/L — SIGNIFICANT CHANGE UP (ref 40–120)
ALT FLD-CCNC: 19 U/L — SIGNIFICANT CHANGE UP
ANION GAP SERPL CALC-SCNC: 11 MMOL/L — SIGNIFICANT CHANGE UP (ref 5–17)
AST SERPL-CCNC: 19 U/L — SIGNIFICANT CHANGE UP
BILIRUB SERPL-MCNC: 0.4 MG/DL — SIGNIFICANT CHANGE UP (ref 0.4–2)
BUN SERPL-MCNC: 21.7 MG/DL — HIGH (ref 8–20)
CALCIUM SERPL-MCNC: 9.1 MG/DL — SIGNIFICANT CHANGE UP (ref 8.6–10.2)
CHLORIDE SERPL-SCNC: 101 MMOL/L — SIGNIFICANT CHANGE UP (ref 98–107)
CO2 SERPL-SCNC: 22 MMOL/L — SIGNIFICANT CHANGE UP (ref 22–29)
CREAT SERPL-MCNC: 1.26 MG/DL — SIGNIFICANT CHANGE UP (ref 0.5–1.3)
CULTURE RESULTS: SIGNIFICANT CHANGE UP
CULTURE RESULTS: SIGNIFICANT CHANGE UP
EGFR: 73 ML/MIN/1.73M2 — SIGNIFICANT CHANGE UP
GLUCOSE BLDC GLUCOMTR-MCNC: 183 MG/DL — HIGH (ref 70–99)
GLUCOSE BLDC GLUCOMTR-MCNC: 202 MG/DL — HIGH (ref 70–99)
GLUCOSE BLDC GLUCOMTR-MCNC: 272 MG/DL — HIGH (ref 70–99)
GLUCOSE BLDC GLUCOMTR-MCNC: 297 MG/DL — HIGH (ref 70–99)
GLUCOSE SERPL-MCNC: 207 MG/DL — HIGH (ref 70–99)
GRAM STN FLD: SIGNIFICANT CHANGE UP
HCT VFR BLD CALC: 33.6 % — LOW (ref 39–50)
HGB BLD-MCNC: 11.1 G/DL — LOW (ref 13–17)
MAGNESIUM SERPL-MCNC: 1.7 MG/DL — SIGNIFICANT CHANGE UP (ref 1.6–2.6)
MCHC RBC-ENTMCNC: 28.8 PG — SIGNIFICANT CHANGE UP (ref 27–34)
MCHC RBC-ENTMCNC: 33 GM/DL — SIGNIFICANT CHANGE UP (ref 32–36)
MCV RBC AUTO: 87.3 FL — SIGNIFICANT CHANGE UP (ref 80–100)
PHOSPHATE SERPL-MCNC: 3.2 MG/DL — SIGNIFICANT CHANGE UP (ref 2.4–4.7)
PLATELET # BLD AUTO: 414 K/UL — HIGH (ref 150–400)
POTASSIUM SERPL-MCNC: 3.9 MMOL/L — SIGNIFICANT CHANGE UP (ref 3.5–5.3)
POTASSIUM SERPL-SCNC: 3.9 MMOL/L — SIGNIFICANT CHANGE UP (ref 3.5–5.3)
PROT SERPL-MCNC: 8.4 G/DL — SIGNIFICANT CHANGE UP (ref 6.6–8.7)
RBC # BLD: 3.85 M/UL — LOW (ref 4.2–5.8)
RBC # FLD: 13.1 % — SIGNIFICANT CHANGE UP (ref 10.3–14.5)
SODIUM SERPL-SCNC: 134 MMOL/L — LOW (ref 135–145)
SPECIMEN SOURCE: SIGNIFICANT CHANGE UP
VANCOMYCIN TROUGH SERPL-MCNC: 12.9 UG/ML — SIGNIFICANT CHANGE UP (ref 10–20)
WBC # BLD: 11.42 K/UL — HIGH (ref 3.8–10.5)
WBC # FLD AUTO: 11.42 K/UL — HIGH (ref 3.8–10.5)

## 2022-06-03 PROCEDURE — 99232 SBSQ HOSP IP/OBS MODERATE 35: CPT

## 2022-06-03 PROCEDURE — 99233 SBSQ HOSP IP/OBS HIGH 50: CPT

## 2022-06-03 RX ORDER — VANCOMYCIN HCL 1 G
750 VIAL (EA) INTRAVENOUS EVERY 12 HOURS
Refills: 0 | Status: DISCONTINUED | OUTPATIENT
Start: 2022-06-03 | End: 2022-06-04

## 2022-06-03 RX ADMIN — PREGABALIN 1000 MICROGRAM(S): 225 CAPSULE ORAL at 11:21

## 2022-06-03 RX ADMIN — Medication 3 UNIT(S): at 16:23

## 2022-06-03 RX ADMIN — INSULIN GLARGINE 10 UNIT(S): 100 INJECTION, SOLUTION SUBCUTANEOUS at 22:31

## 2022-06-03 RX ADMIN — Medication 3 UNIT(S): at 07:53

## 2022-06-03 RX ADMIN — PIPERACILLIN AND TAZOBACTAM 25 GRAM(S): 4; .5 INJECTION, POWDER, LYOPHILIZED, FOR SOLUTION INTRAVENOUS at 22:30

## 2022-06-03 RX ADMIN — Medication 3: at 22:30

## 2022-06-03 RX ADMIN — Medication 250 MILLIGRAM(S): at 17:20

## 2022-06-03 RX ADMIN — Medication 2: at 11:22

## 2022-06-03 RX ADMIN — PIPERACILLIN AND TAZOBACTAM 25 GRAM(S): 4; .5 INJECTION, POWDER, LYOPHILIZED, FOR SOLUTION INTRAVENOUS at 14:31

## 2022-06-03 RX ADMIN — Medication 3: at 16:22

## 2022-06-03 RX ADMIN — GABAPENTIN 300 MILLIGRAM(S): 400 CAPSULE ORAL at 22:30

## 2022-06-03 RX ADMIN — Medication 250 MILLIGRAM(S): at 08:18

## 2022-06-03 RX ADMIN — Medication 1: at 07:52

## 2022-06-03 RX ADMIN — Medication 3 UNIT(S): at 11:22

## 2022-06-03 RX ADMIN — PIPERACILLIN AND TAZOBACTAM 25 GRAM(S): 4; .5 INJECTION, POWDER, LYOPHILIZED, FOR SOLUTION INTRAVENOUS at 07:03

## 2022-06-03 NOTE — CHART NOTE - NSCHARTNOTEFT_GEN_A_CORE
Diabetes Note: Aware consult for diabetes education; pt with hgba1c 9.3%.  Spoke with pt via  #[id number].  Pt admitted with right toe osteomyelitis and s/o OR (6/3) for resection,  Pt reports taking insulin in the past, then stopped, and recently restarted once he started having "foot problems."  Pt unable to recall name of insulin, but states he was taking 70 units after breakfast.  Pt does not SMBG at this time and does not have a glucometer at home.  Pt reports some understanding of types of food that raise blood sugar, but was asking for more information.  Pt also reports that he follows up at the [facility], but was upset that he follows with up a different doctor each visit.  Pt states he would like to find a PMD in order to receive more consistent medical care and diabetes management.  Provided diabetes self management education to patient:    - Discussed a1c results and potential long term complications of poorly managed diabetes (emphasis on wound healing)  - Discussed cons cho meal plan using the plate method with emphasis on meal timing and appropriate CHO/protein portions (with examples)   - Reviewed appropriate beverage choices as pt reports consuming "natural juice"  - Reinforced importance of BG monitoring and adhering to prescribed insulin regimen to achieve euglycemia  - Encouraged outpatient follow up with PMD for continuous diabetes self management     Overall, pt was very receptive and demonstrates good understanding of information reviewed.  Pt with many questions about cons cho meal plan; all were answered.  Nutrition literature provided in [place] per pt's request.  Spoke with  about assisting patient to receive a glucometer upon discharge.  BREA RENTERIA to remain available.

## 2022-06-03 NOTE — DIETITIAN INITIAL EVALUATION ADULT - PERTINENT MEDS FT
MEDICATIONS  (STANDING):  cyanocobalamin 1000 MICROGram(s) Oral daily  dextrose 5%. 1000 milliLiter(s) (50 mL/Hr) IV Continuous <Continuous>  dextrose 5%. 1000 milliLiter(s) (100 mL/Hr) IV Continuous <Continuous>  dextrose 5%. 1000 milliLiter(s) (100 mL/Hr) IV Continuous <Continuous>  dextrose 5%. 1000 milliLiter(s) (50 mL/Hr) IV Continuous <Continuous>  dextrose 50% Injectable 25 Gram(s) IV Push once  dextrose 50% Injectable 12.5 Gram(s) IV Push once  dextrose 50% Injectable 25 Gram(s) IV Push once  dextrose 50% Injectable 25 Gram(s) IV Push once  dextrose 50% Injectable 12.5 Gram(s) IV Push once  dextrose 50% Injectable 25 Gram(s) IV Push once  gabapentin 300 milliGRAM(s) Oral at bedtime  glucagon  Injectable 1 milliGRAM(s) IntraMuscular once  glucagon  Injectable 1 milliGRAM(s) IntraMuscular once  glucagon  Injectable 1 milliGRAM(s) IntraMuscular once  insulin glargine Injectable (LANTUS) 10 Unit(s) SubCutaneous at bedtime  insulin lispro (ADMELOG) corrective regimen sliding scale   SubCutaneous Before meals and at bedtime  insulin lispro Injectable (ADMELOG) 3 Unit(s) SubCutaneous three times a day before meals  piperacillin/tazobactam IVPB.. 3.375 Gram(s) IV Intermittent every 8 hours  vancomycin  IVPB 750 milliGRAM(s) IV Intermittent every 12 hours    MEDICATIONS  (PRN):  acetaminophen     Tablet .. 650 milliGRAM(s) Oral every 6 hours PRN Temp greater or equal to 38C (100.4F), Mild Pain (1 - 3)  aluminum hydroxide/magnesium hydroxide/simethicone Suspension 30 milliLiter(s) Oral every 4 hours PRN Dyspepsia  dextrose Oral Gel 15 Gram(s) Oral once PRN Blood Glucose LESS THAN 70 milliGRAM(s)/deciliter  dextrose Oral Gel 15 Gram(s) Oral once PRN Blood Glucose LESS THAN 70 milliGRAM(s)/deciliter  melatonin 3 milliGRAM(s) Oral at bedtime PRN Insomnia  ondansetron Injectable 4 milliGRAM(s) IV Push every 8 hours PRN Nausea and/or Vomiting

## 2022-06-03 NOTE — PROGRESS NOTE ADULT - ASSESSMENT
T2DM    will increase Admelog to  5 unit tid ac   increase Lantus to 14 untis   s/p toe amputaiotn     cont IV antibiotics

## 2022-06-03 NOTE — PROGRESS NOTE ADULT - ASSESSMENT
This  43 y/o M with DM on insulin who presents to the ED c/o right 5th toe infection. Patient noted infection about 1.5 months ago, reports that the toe was turning black with associated pain. Saw you physician, Dr. Ryan Bravo, Telefax [phone],  in [facility] who gave him a cream as well as PO antibiotics (3rd generation cephalosporin and Levaquin), which he has been taking for about a month. The wound was not healing properly so he went to a clinic and was informed to come to the ED for further care. Has chronic LE neuropathy. Denies fever, chill, CP, SOB, n/v/c/d nor urinary concerns.   ED vials stable, labs with elevated ESR/CRP, s/p Vanc and Zosyn, admitted for IV antibiotics.  (29 May 2022 17:11)    he has been treated in [facility], [place]   Patient brought a doctors note from [facility], which stated that he's been treated since April 22, 2022. Per the note, he had been treated with a mix of 3rd generation cephalosporin, Levaquin 750mg daily, and secnidazole.  he was also given topical Sulfrexal gel (Ketanserin) to be applied to the wound.     No fevers noted here.  he was started on Vanco and Zosyn here.  Podiatry was consulted, who recommended surgical amputation of the toe.   Blood culture x 2 sets were sent.       Impression:  diabetes in poor control  Right toe gangrene  Right foot osteomyelitis  foot infection    Plan:  - suspect polymicrobial infection  - continue zosyn and vancomycin  - blood cultures sent.   - MRI confirms osteomyelitis      s/p surgical resection.  6/2/22  OR CULTURES sent, will follow       Regarding Diabetes, poorly controlled  - needs adequate control for wound healing  - most recent A1c is: 9.3

## 2022-06-03 NOTE — DIETITIAN INITIAL EVALUATION ADULT - PERTINENT LABORATORY DATA
06-03    134<L>  |  101  |  21.7<H>  ----------------------------<  207<H>  3.9   |  22.0  |  1.26    Ca    9.1      03 Jun 2022 06:00  Phos  3.2     06-03  Mg     1.7     06-03    TPro  8.4  /  Alb  3.6  /  TBili  0.4  /  DBili  x   /  AST  19  /  ALT  19  /  AlkPhos  67  06-03  POCT Blood Glucose.: 202 mg/dL (06-03-22 @ 11:20)  A1C with Estimated Average Glucose Result: 9.3 % (05-30-22 @ 04:18)

## 2022-06-03 NOTE — DIETITIAN INITIAL EVALUATION ADULT - OTHER INFO
41 y/o M with PMHx IDDM who presents to the ED c/o right 5th toe infection. Pt noted infection about 1.5 months ago, reports that the toe was turning black with associated pain. Saw physician in [facility] who gave him a cream as well as PO antibiotics, which he has been taking for about a month. The wound was not healing properly so he went to a clinic and was informed to come to the ED for further care. Has chronic LE neuropathy. Pt s/p amp of 5th R toe 2/2 OM (6/2). Pt was provided nutrition education by BREA RENTERIA, denies having questions at this time.

## 2022-06-03 NOTE — PROGRESS NOTE ADULT - ASSESSMENT
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    ·	Diabetes with neuropathy    Plan  ·	Patient is seen and evaluated at bed side in [facility].   ·	Dressing to right foot left intact to be changed tomorrow by podiatry.   ·	NWB to right foot.   ·	Continue with IV antibiotics as per ID recommendations  ·	DVT prophylaxis and pain management as per Medical team  ·	Podiatry will follow while he is in the hospital.

## 2022-06-03 NOTE — PROGRESS NOTE ADULT - ASSESSMENT
43 y/o M with PMHx IDDM who presents to the ED c/o right 5th toe infection, admitted for toe OM.    #Toe osteomyelitis   - ESR and CRP elevated  - toe xray result + OM  - c/w empiric Vanc and Zosyn  - f/u Vanc trough   - BCx NGTD  - podiatry consulted, poncho recs  - MRI foot result noted, +OM  - podiatry, ID and vascular consulted, poncho recs  - s/p OR 6/3 for resection, surgical cultures result pending  - will likely need PICC     #Anemia  - likely of chronic disease  - daily CBC  - B12 low, start supplementation     #IDDM  - on home NPH 60U QD  - c/w home Gabapentin  - A1C 9.8, endo and DM educator consulted  - Accuc checks, c/w Lantus 10U + ISS, up titrate as needed    DVT: Ambulate as tolerated, SCD  Diet: Consistent carb  Dispo: pending PT eval, OR cultures 43 y/o M with PMHx IDDM who presents to the ED c/o right 5th toe infection, admitted for toe OM.    #Toe osteomyelitis   - ESR and CRP elevated  - toe xray result + OM  - c/w empiric Vanc and Zosyn  - f/u Vanc trough   - BCx NGTD  - podiatry consulted, poncho recs  - MRI foot result noted, +OM  - podiatry, ID and vascular consulted, poncho recs  - s/p OR 6/3 for resection, surgical cultures result pending  - will likely need PICC     #Leukocytosis  - suspect reactive, trend CBC    #Hypouremia  - daily BMP    #Anemia  - likely of chronic disease  - daily CBC  - B12 low, start supplementation     #IDDM  - on home NPH 60U QD  - c/w home Gabapentin  - A1C 9.8, endo and DM educator consulted  - Accuc checks, c/w Lantus 10U + ISS, up titrate as needed    DVT: Ambulate as tolerated, SCD  Diet: Consistent carb  Dispo: pending PT eval, OR cultures

## 2022-06-03 NOTE — PHYSICAL THERAPY INITIAL EVALUATION ADULT - ADDITIONAL COMMENTS
Pt lives in a house with 4-5 JENNIFER with a HR and no steps inside. Has crutches at home but prefers RW.

## 2022-06-04 LAB
ALBUMIN SERPL ELPH-MCNC: 3.8 G/DL — SIGNIFICANT CHANGE UP (ref 3.3–5.2)
ALP SERPL-CCNC: 80 U/L — SIGNIFICANT CHANGE UP (ref 40–120)
ALT FLD-CCNC: 28 U/L — SIGNIFICANT CHANGE UP
ANION GAP SERPL CALC-SCNC: 12 MMOL/L — SIGNIFICANT CHANGE UP (ref 5–17)
AST SERPL-CCNC: 26 U/L — SIGNIFICANT CHANGE UP
BASOPHILS # BLD AUTO: 0.09 K/UL — SIGNIFICANT CHANGE UP (ref 0–0.2)
BASOPHILS NFR BLD AUTO: 1 % — SIGNIFICANT CHANGE UP (ref 0–2)
BILIRUB SERPL-MCNC: 0.2 MG/DL — LOW (ref 0.4–2)
BUN SERPL-MCNC: 18.3 MG/DL — SIGNIFICANT CHANGE UP (ref 8–20)
CALCIUM SERPL-MCNC: 9.6 MG/DL — SIGNIFICANT CHANGE UP (ref 8.6–10.2)
CHLORIDE SERPL-SCNC: 101 MMOL/L — SIGNIFICANT CHANGE UP (ref 98–107)
CO2 SERPL-SCNC: 25 MMOL/L — SIGNIFICANT CHANGE UP (ref 22–29)
CREAT SERPL-MCNC: 1.28 MG/DL — SIGNIFICANT CHANGE UP (ref 0.5–1.3)
EGFR: 71 ML/MIN/1.73M2 — SIGNIFICANT CHANGE UP
EOSINOPHIL # BLD AUTO: 0.12 K/UL — SIGNIFICANT CHANGE UP (ref 0–0.5)
EOSINOPHIL NFR BLD AUTO: 1.3 % — SIGNIFICANT CHANGE UP (ref 0–6)
GLUCOSE BLDC GLUCOMTR-MCNC: 112 MG/DL — HIGH (ref 70–99)
GLUCOSE BLDC GLUCOMTR-MCNC: 129 MG/DL — HIGH (ref 70–99)
GLUCOSE BLDC GLUCOMTR-MCNC: 168 MG/DL — HIGH (ref 70–99)
GLUCOSE BLDC GLUCOMTR-MCNC: 218 MG/DL — HIGH (ref 70–99)
GLUCOSE SERPL-MCNC: 165 MG/DL — HIGH (ref 70–99)
HCT VFR BLD CALC: 35.4 % — LOW (ref 39–50)
HGB BLD-MCNC: 11.5 G/DL — LOW (ref 13–17)
IMM GRANULOCYTES NFR BLD AUTO: 0.8 % — SIGNIFICANT CHANGE UP (ref 0–1.5)
LYMPHOCYTES # BLD AUTO: 3.87 K/UL — HIGH (ref 1–3.3)
LYMPHOCYTES # BLD AUTO: 43 % — SIGNIFICANT CHANGE UP (ref 13–44)
MAGNESIUM SERPL-MCNC: 1.5 MG/DL — LOW (ref 1.6–2.6)
MCHC RBC-ENTMCNC: 28.6 PG — SIGNIFICANT CHANGE UP (ref 27–34)
MCHC RBC-ENTMCNC: 32.5 GM/DL — SIGNIFICANT CHANGE UP (ref 32–36)
MCV RBC AUTO: 88.1 FL — SIGNIFICANT CHANGE UP (ref 80–100)
MONOCYTES # BLD AUTO: 0.66 K/UL — SIGNIFICANT CHANGE UP (ref 0–0.9)
MONOCYTES NFR BLD AUTO: 7.3 % — SIGNIFICANT CHANGE UP (ref 2–14)
NEUTROPHILS # BLD AUTO: 4.19 K/UL — SIGNIFICANT CHANGE UP (ref 1.8–7.4)
NEUTROPHILS NFR BLD AUTO: 46.6 % — SIGNIFICANT CHANGE UP (ref 43–77)
PHOSPHATE SERPL-MCNC: 3.8 MG/DL — SIGNIFICANT CHANGE UP (ref 2.4–4.7)
PLATELET # BLD AUTO: 383 K/UL — SIGNIFICANT CHANGE UP (ref 150–400)
POTASSIUM SERPL-MCNC: 4 MMOL/L — SIGNIFICANT CHANGE UP (ref 3.5–5.3)
POTASSIUM SERPL-SCNC: 4 MMOL/L — SIGNIFICANT CHANGE UP (ref 3.5–5.3)
PROT SERPL-MCNC: 8.7 G/DL — SIGNIFICANT CHANGE UP (ref 6.6–8.7)
RBC # BLD: 4.02 M/UL — LOW (ref 4.2–5.8)
RBC # FLD: 13.2 % — SIGNIFICANT CHANGE UP (ref 10.3–14.5)
SODIUM SERPL-SCNC: 138 MMOL/L — SIGNIFICANT CHANGE UP (ref 135–145)
VANCOMYCIN TROUGH SERPL-MCNC: 13 UG/ML — SIGNIFICANT CHANGE UP (ref 10–20)
WBC # BLD: 9 K/UL — SIGNIFICANT CHANGE UP (ref 3.8–10.5)
WBC # FLD AUTO: 9 K/UL — SIGNIFICANT CHANGE UP (ref 3.8–10.5)

## 2022-06-04 PROCEDURE — 99232 SBSQ HOSP IP/OBS MODERATE 35: CPT

## 2022-06-04 PROCEDURE — 99233 SBSQ HOSP IP/OBS HIGH 50: CPT

## 2022-06-04 RX ORDER — INSULIN LISPRO 100/ML
5 VIAL (ML) SUBCUTANEOUS
Refills: 0 | Status: DISCONTINUED | OUTPATIENT
Start: 2022-06-04 | End: 2022-06-08

## 2022-06-04 RX ORDER — INSULIN GLARGINE 100 [IU]/ML
14 INJECTION, SOLUTION SUBCUTANEOUS AT BEDTIME
Refills: 0 | Status: DISCONTINUED | OUTPATIENT
Start: 2022-06-04 | End: 2022-06-08

## 2022-06-04 RX ADMIN — Medication 3 MILLIGRAM(S): at 21:22

## 2022-06-04 RX ADMIN — Medication 5 UNIT(S): at 16:09

## 2022-06-04 RX ADMIN — PREGABALIN 1000 MICROGRAM(S): 225 CAPSULE ORAL at 11:13

## 2022-06-04 RX ADMIN — GABAPENTIN 300 MILLIGRAM(S): 400 CAPSULE ORAL at 21:19

## 2022-06-04 RX ADMIN — Medication 5 UNIT(S): at 07:50

## 2022-06-04 RX ADMIN — Medication 5 UNIT(S): at 11:13

## 2022-06-04 RX ADMIN — INSULIN GLARGINE 14 UNIT(S): 100 INJECTION, SOLUTION SUBCUTANEOUS at 21:53

## 2022-06-04 RX ADMIN — Medication 2: at 07:49

## 2022-06-04 RX ADMIN — PIPERACILLIN AND TAZOBACTAM 25 GRAM(S): 4; .5 INJECTION, POWDER, LYOPHILIZED, FOR SOLUTION INTRAVENOUS at 21:23

## 2022-06-04 RX ADMIN — PIPERACILLIN AND TAZOBACTAM 25 GRAM(S): 4; .5 INJECTION, POWDER, LYOPHILIZED, FOR SOLUTION INTRAVENOUS at 13:56

## 2022-06-04 RX ADMIN — Medication 1: at 11:12

## 2022-06-04 RX ADMIN — Medication 250 MILLIGRAM(S): at 06:59

## 2022-06-04 RX ADMIN — PIPERACILLIN AND TAZOBACTAM 25 GRAM(S): 4; .5 INJECTION, POWDER, LYOPHILIZED, FOR SOLUTION INTRAVENOUS at 05:51

## 2022-06-04 NOTE — PROGRESS NOTE ADULT - ASSESSMENT
41 y/o M with PMHx IDDM who presents to the ED c/o right 5th toe infection, admitted for toe OM.    1. Toe osteomyelitis    s/p OR 6/3 for resection, surgical cultures growing staph aureas   c/w Vancomyin   Would need  need PICC       2. Mild anemia of chronic disease, stable     3. Uncontrolled type 2 DM with neuropathy   HBA1C 9.3, BG controlled   - on home NPH 60U QD  - c/w home Gabapentin  c/w Lantus 10U + ISS, up titrate as needed    DVT: Ambulate as tolerated, SCD  Diet: Consistent carb  Dispo: pending PT eval, OR cultures

## 2022-06-04 NOTE — PROGRESS NOTE ADULT - ASSESSMENT
This  41 y/o M with DM on insulin who presents to the ED c/o right 5th toe infection. Patient noted infection about 1.5 months ago, reports that the toe was turning black with associated pain. Saw you physician, Dr. Ryan Bravo, Telefax [phone],  in [facility] who gave him a cream as well as PO antibiotics (3rd generation cephalosporin and Levaquin), which he has been taking for about a month. The wound was not healing properly so he went to a clinic and was informed to come to the ED for further care. Has chronic LE neuropathy. Denies fever, chill, CP, SOB, n/v/c/d nor urinary concerns.   ED vials stable, labs with elevated ESR/CRP, s/p Vanc and Zosyn, admitted for IV antibiotics.  (29 May 2022 17:11)    he has been treated in [facility], [place] the [place]   Patient brought a doctors note from [facility], which stated that he's been treated since April 22, 2022. Per the note, he had been treated with a mix of 3rd generation cephalosporin, Levaquin 750mg daily, and secnidazole.  he was also given topical Sulfrexal gel (Ketanserin) to be applied to the wound.     No fevers noted here.  he was started on Vanco and Zosyn here.  Podiatry was consulted, who recommended surgical amputation of the toe.   Blood culture x 2 sets were sent.       Impression:  diabetes in poor control  Right toe gangrene  Right foot osteomyelitis  foot infection    Plan:  - suspect polymicrobial infection  - MRI confirms osteomyelitis    s/p surgical resection.  6/2/22  OR CULTURES sent, will follow  - bone cultures remain negative so far      - stopped vancomycin 6/4/22  - continue current antibiotics:  piperacillin/tazobactam IVPB.. 3.375 Gram(s) IV Intermittent every 8 hours     if cultures remain negative, will plan for de-escalation to cefazolin 2 grams Q 8H    Regarding Diabetes, poorly controlled  - needs adequate control for wound healing  - most recent A1c is: 9.3      Anticipate PICC Line

## 2022-06-04 NOTE — PROGRESS NOTE ADULT - ASSESSMENT
T2DM   cont rx    stable control    s/p right 5th  toe amputation  cont anitbiotic a  podiatry  follow up

## 2022-06-04 NOTE — PROGRESS NOTE ADULT - ASSESSMENT
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    ·	Diabetes with neuropathy    Plan  ·	Patient is seen and evaluated at bed side in NAD.   ·	Dressing changed. Betadine dsd applied.   ·	NWB to right foot.   ·	Continue with IV antibiotics as per ID recommendations  ·	DVT prophylaxis and pain management as per Medical team  ·	Podiatry will follow while he is in the hospital.       Wound Care orders;  Please pain the surgical side with betadine then apply xeroform and cover it with DSD and ACE change every 3 days. Thank you.

## 2022-06-05 LAB
-  AMIKACIN: SIGNIFICANT CHANGE UP
-  AMOXICILLIN/CLAVULANIC ACID: SIGNIFICANT CHANGE UP
-  AMPICILLIN/SULBACTAM: SIGNIFICANT CHANGE UP
-  AMPICILLIN: SIGNIFICANT CHANGE UP
-  AZTREONAM: SIGNIFICANT CHANGE UP
-  CEFAZOLIN: SIGNIFICANT CHANGE UP
-  CEFEPIME: SIGNIFICANT CHANGE UP
-  CEFOXITIN: SIGNIFICANT CHANGE UP
-  CEFTRIAXONE: SIGNIFICANT CHANGE UP
-  CIPROFLOXACIN: SIGNIFICANT CHANGE UP
-  CLINDAMYCIN: SIGNIFICANT CHANGE UP
-  CLINDAMYCIN: SIGNIFICANT CHANGE UP
-  DAPTOMYCIN: SIGNIFICANT CHANGE UP
-  DAPTOMYCIN: SIGNIFICANT CHANGE UP
-  ERTAPENEM: SIGNIFICANT CHANGE UP
-  ERYTHROMYCIN: SIGNIFICANT CHANGE UP
-  ERYTHROMYCIN: SIGNIFICANT CHANGE UP
-  GENTAMICIN: SIGNIFICANT CHANGE UP
-  IMIPENEM: SIGNIFICANT CHANGE UP
-  LEVOFLOXACIN: SIGNIFICANT CHANGE UP
-  LINEZOLID: SIGNIFICANT CHANGE UP
-  LINEZOLID: SIGNIFICANT CHANGE UP
-  MEROPENEM: SIGNIFICANT CHANGE UP
-  OXACILLIN: SIGNIFICANT CHANGE UP
-  OXACILLIN: SIGNIFICANT CHANGE UP
-  PENICILLIN: SIGNIFICANT CHANGE UP
-  PENICILLIN: SIGNIFICANT CHANGE UP
-  PIPERACILLIN/TAZOBACTAM: SIGNIFICANT CHANGE UP
-  RIFAMPIN: SIGNIFICANT CHANGE UP
-  RIFAMPIN: SIGNIFICANT CHANGE UP
-  TETRACYCLINE: SIGNIFICANT CHANGE UP
-  TETRACYCLINE: SIGNIFICANT CHANGE UP
-  TOBRAMYCIN: SIGNIFICANT CHANGE UP
-  TRIMETHOPRIM/SULFAMETHOXAZOLE: SIGNIFICANT CHANGE UP
-  VANCOMYCIN: SIGNIFICANT CHANGE UP
-  VANCOMYCIN: SIGNIFICANT CHANGE UP
GLUCOSE BLDC GLUCOMTR-MCNC: 135 MG/DL — HIGH (ref 70–99)
GLUCOSE BLDC GLUCOMTR-MCNC: 166 MG/DL — HIGH (ref 70–99)
GLUCOSE BLDC GLUCOMTR-MCNC: 194 MG/DL — HIGH (ref 70–99)
GLUCOSE BLDC GLUCOMTR-MCNC: 208 MG/DL — HIGH (ref 70–99)
METHOD TYPE: SIGNIFICANT CHANGE UP

## 2022-06-05 PROCEDURE — 99233 SBSQ HOSP IP/OBS HIGH 50: CPT

## 2022-06-05 RX ADMIN — Medication 5 UNIT(S): at 16:52

## 2022-06-05 RX ADMIN — Medication 5 UNIT(S): at 11:58

## 2022-06-05 RX ADMIN — Medication 1: at 11:59

## 2022-06-05 RX ADMIN — Medication 1: at 16:52

## 2022-06-05 RX ADMIN — PIPERACILLIN AND TAZOBACTAM 25 GRAM(S): 4; .5 INJECTION, POWDER, LYOPHILIZED, FOR SOLUTION INTRAVENOUS at 21:14

## 2022-06-05 RX ADMIN — INSULIN GLARGINE 14 UNIT(S): 100 INJECTION, SOLUTION SUBCUTANEOUS at 21:14

## 2022-06-05 RX ADMIN — PIPERACILLIN AND TAZOBACTAM 25 GRAM(S): 4; .5 INJECTION, POWDER, LYOPHILIZED, FOR SOLUTION INTRAVENOUS at 13:35

## 2022-06-05 RX ADMIN — Medication 2: at 21:13

## 2022-06-05 RX ADMIN — PIPERACILLIN AND TAZOBACTAM 25 GRAM(S): 4; .5 INJECTION, POWDER, LYOPHILIZED, FOR SOLUTION INTRAVENOUS at 05:47

## 2022-06-05 RX ADMIN — Medication 5 UNIT(S): at 07:55

## 2022-06-05 RX ADMIN — GABAPENTIN 300 MILLIGRAM(S): 400 CAPSULE ORAL at 21:15

## 2022-06-05 RX ADMIN — PREGABALIN 1000 MICROGRAM(S): 225 CAPSULE ORAL at 11:58

## 2022-06-05 NOTE — PROGRESS NOTE ADULT - ASSESSMENT
43 y/o M with PMHx IDDM who presents to the ED c/o right 5th toe infection, admitted for toe OM.    1. Toe osteomyelitis    s/p OR 6/3 for resection, surgical cultures growing staph aureas   c/w Vancomyin   Would need  need PICC       2. Mild anemia of chronic disease, stable     3. Uncontrolled type 2 DM with neuropathy   HBA1C 9.3, BG controlled   - on home NPH 60U QD  - c/w home Gabapentin  c/w Lantus 10U + ISS, up titrate as needed    DVT: Ambulate as tolerated, SCD  Diet: Consistent carb  Dispo: pending PT eval, OR cultures

## 2022-06-06 ENCOUNTER — TRANSCRIPTION ENCOUNTER (OUTPATIENT)
Age: 42
End: 2022-06-06

## 2022-06-06 LAB
-  AMIKACIN: SIGNIFICANT CHANGE UP
-  AMIKACIN: SIGNIFICANT CHANGE UP
-  AMOXICILLIN/CLAVULANIC ACID: SIGNIFICANT CHANGE UP
-  AMPICILLIN/SULBACTAM: SIGNIFICANT CHANGE UP
-  AMPICILLIN: SIGNIFICANT CHANGE UP
-  AZTREONAM: SIGNIFICANT CHANGE UP
-  AZTREONAM: SIGNIFICANT CHANGE UP
-  CEFAZOLIN: SIGNIFICANT CHANGE UP
-  CEFEPIME: SIGNIFICANT CHANGE UP
-  CEFEPIME: SIGNIFICANT CHANGE UP
-  CEFTAZIDIME: SIGNIFICANT CHANGE UP
-  CEFTRIAXONE: SIGNIFICANT CHANGE UP
-  CIPROFLOXACIN: SIGNIFICANT CHANGE UP
-  CIPROFLOXACIN: SIGNIFICANT CHANGE UP
-  ERTAPENEM: SIGNIFICANT CHANGE UP
-  GENTAMICIN: SIGNIFICANT CHANGE UP
-  GENTAMICIN: SIGNIFICANT CHANGE UP
-  IMIPENEM: SIGNIFICANT CHANGE UP
-  IMIPENEM: SIGNIFICANT CHANGE UP
-  LEVOFLOXACIN: SIGNIFICANT CHANGE UP
-  LEVOFLOXACIN: SIGNIFICANT CHANGE UP
-  MEROPENEM: SIGNIFICANT CHANGE UP
-  MEROPENEM: SIGNIFICANT CHANGE UP
-  PIPERACILLIN/TAZOBACTAM: SIGNIFICANT CHANGE UP
-  PIPERACILLIN/TAZOBACTAM: SIGNIFICANT CHANGE UP
-  TOBRAMYCIN: SIGNIFICANT CHANGE UP
-  TOBRAMYCIN: SIGNIFICANT CHANGE UP
-  TRIMETHOPRIM/SULFAMETHOXAZOLE: SIGNIFICANT CHANGE UP
CREAT SERPL-MCNC: 1.55 MG/DL — HIGH (ref 0.5–1.3)
EGFR: 57 ML/MIN/1.73M2 — LOW
GLUCOSE BLDC GLUCOMTR-MCNC: 146 MG/DL — HIGH (ref 70–99)
GLUCOSE BLDC GLUCOMTR-MCNC: 149 MG/DL — HIGH (ref 70–99)
GLUCOSE BLDC GLUCOMTR-MCNC: 166 MG/DL — HIGH (ref 70–99)
GLUCOSE BLDC GLUCOMTR-MCNC: 186 MG/DL — HIGH (ref 70–99)
METHOD TYPE: SIGNIFICANT CHANGE UP
METHOD TYPE: SIGNIFICANT CHANGE UP
SARS-COV-2 RNA SPEC QL NAA+PROBE: SIGNIFICANT CHANGE UP

## 2022-06-06 PROCEDURE — 71045 X-RAY EXAM CHEST 1 VIEW: CPT | Mod: 26

## 2022-06-06 PROCEDURE — 76942 ECHO GUIDE FOR BIOPSY: CPT | Mod: 26,59

## 2022-06-06 PROCEDURE — 99232 SBSQ HOSP IP/OBS MODERATE 35: CPT

## 2022-06-06 PROCEDURE — 36569 INSJ PICC 5 YR+ W/O IMAGING: CPT

## 2022-06-06 PROCEDURE — 76937 US GUIDE VASCULAR ACCESS: CPT | Mod: 26,59

## 2022-06-06 RX ORDER — MEROPENEM 1 G/30ML
2000 INJECTION INTRAVENOUS
Qty: 168000 | Refills: 0
Start: 2022-06-06 | End: 2022-07-03

## 2022-06-06 RX ORDER — CHLORHEXIDINE GLUCONATE 213 G/1000ML
1 SOLUTION TOPICAL
Refills: 0 | Status: DISCONTINUED | OUTPATIENT
Start: 2022-06-06 | End: 2022-06-08

## 2022-06-06 RX ORDER — VANCOMYCIN HCL 1 G
1000 VIAL (EA) INTRAVENOUS EVERY 12 HOURS
Refills: 0 | Status: DISCONTINUED | OUTPATIENT
Start: 2022-06-06 | End: 2022-06-07

## 2022-06-06 RX ORDER — VANCOMYCIN HCL 1 G
1 VIAL (EA) INTRAVENOUS
Qty: 56 | Refills: 0
Start: 2022-06-06 | End: 2022-07-03

## 2022-06-06 RX ORDER — CEFPODOXIME PROXETIL 100 MG
0 TABLET ORAL
Qty: 0 | Refills: 0 | DISCHARGE

## 2022-06-06 RX ORDER — MEROPENEM 1 G/30ML
1000 INJECTION INTRAVENOUS EVERY 8 HOURS
Refills: 0 | Status: DISCONTINUED | OUTPATIENT
Start: 2022-06-06 | End: 2022-06-06

## 2022-06-06 RX ORDER — MEROPENEM 1 G/30ML
2000 INJECTION INTRAVENOUS EVERY 8 HOURS
Refills: 0 | Status: DISCONTINUED | OUTPATIENT
Start: 2022-06-06 | End: 2022-06-07

## 2022-06-06 RX ORDER — SODIUM CHLORIDE 9 MG/ML
10 INJECTION INTRAMUSCULAR; INTRAVENOUS; SUBCUTANEOUS
Refills: 0 | Status: DISCONTINUED | OUTPATIENT
Start: 2022-06-06 | End: 2022-06-08

## 2022-06-06 RX ADMIN — PREGABALIN 1000 MICROGRAM(S): 225 CAPSULE ORAL at 11:08

## 2022-06-06 RX ADMIN — INSULIN GLARGINE 14 UNIT(S): 100 INJECTION, SOLUTION SUBCUTANEOUS at 22:09

## 2022-06-06 RX ADMIN — Medication 5 UNIT(S): at 07:43

## 2022-06-06 RX ADMIN — MEROPENEM 200 MILLIGRAM(S): 1 INJECTION INTRAVENOUS at 16:58

## 2022-06-06 RX ADMIN — Medication 1: at 22:08

## 2022-06-06 RX ADMIN — Medication 5 UNIT(S): at 11:51

## 2022-06-06 RX ADMIN — GABAPENTIN 300 MILLIGRAM(S): 400 CAPSULE ORAL at 22:09

## 2022-06-06 RX ADMIN — Medication 1: at 07:42

## 2022-06-06 RX ADMIN — Medication 250 MILLIGRAM(S): at 17:28

## 2022-06-06 RX ADMIN — Medication 5 UNIT(S): at 16:46

## 2022-06-06 RX ADMIN — PIPERACILLIN AND TAZOBACTAM 25 GRAM(S): 4; .5 INJECTION, POWDER, LYOPHILIZED, FOR SOLUTION INTRAVENOUS at 05:16

## 2022-06-06 RX ADMIN — CHLORHEXIDINE GLUCONATE 1 APPLICATION(S): 213 SOLUTION TOPICAL at 14:25

## 2022-06-06 RX ADMIN — MEROPENEM 200 MILLIGRAM(S): 1 INJECTION INTRAVENOUS at 22:25

## 2022-06-06 NOTE — PROCEDURE NOTE - NSPOSTPRCRAD_GEN_A_CORE
chest radiograph/depth of insertion/line adjusted to depth of insertion/postion of catheter/ultrasound

## 2022-06-06 NOTE — DISCHARGE NOTE PROVIDER - CARE PROVIDERS DIRECT ADDRESSES
[email or web address]

## 2022-06-06 NOTE — DISCHARGE NOTE PROVIDER - CARE PROVIDER_API CALL
Kirsten Chua)  EndocrinologyMetabDiabetes; Internal Medicine  [address] [place] [address]  Phone: [phone]  Fax: [phone]  Follow Up Time: 1 week    Gilberto Hawley (DPM)  Surgery  [address] [place] [address]  Phone: [phone]  Fax: [phone]  Follow Up Time: 1 week    Colton Estevez)  Infectious Disease; Internal Medicine  [address] [place] [address]  Phone: [phone]  Fax: [phone]  Follow Up Time: 1 week

## 2022-06-06 NOTE — DISCHARGE NOTE PROVIDER - NSDCMRMEDTOKEN_GEN_ALL_CORE_FT
gabapentin 300 mg oral tablet: 1 tab(s) orally once a day (at bedtime)  insulin regular human recombinant 100 units/mL injectable liquid (obsolete): 60 unit(s) injectable once a day  meropenem 1000 mg intravenous injection: 2000 milligram(s) intravenously every 8 hours MDD:thru 7/3/22 weekly CBC CMP ESR CRP  vancomycin 1 g/200 mL intravenous solution: 1 gram(s) intravenously every 12 hours MDD:thru 7/3/22 weekly CBC CMP ESR CRP   gabapentin 300 mg oral tablet: 1 tab(s) orally once a day (at bedtime)  insulin regular human recombinant 100 units/mL injectable liquid (obsolete): 60 unit(s) injectable once a day  meropenem 1000 mg intravenous injection: 2000 milligram(s) intravenously every 8 hours MDD:thru 7/3/22 weekly CBC CMP ESR CRP  mupirocin 2% topical ointment: 1 application nasal 2 times a day   vancomycin 1 g/200 mL intravenous solution: 1 gram(s) intravenously every 12 hours MDD:thru 7/3/22 weekly CBC CMP ESR CRP   cyanocobalamin 1000 mcg oral tablet: 1 tab(s) orally once a day  gabapentin 300 mg oral tablet: 1 tab(s) orally once a day (at bedtime)  insulin regular human recombinant 100 units/mL injectable liquid (obsolete): 60 unit(s) injectable once a day  meropenem 1000 mg intravenous injection: 2000 milligram(s) intravenously every 8 hours MDD:thru 7/3/22 weekly CBC CMP ESR CRP  metFORMIN 1000 mg oral tablet: 1 tab(s) orally 2 times a day  mupirocin 2% topical ointment: 1 application nasal 2 times a day   vancomycin 1 g/200 mL intravenous solution: 1 gram(s) intravenously every 12 hours MDD:thru 7/3/22 weekly CBC CMP ESR CRP

## 2022-06-06 NOTE — PROCEDURE NOTE - NSINFORMCONSENT_GEN_A_CORE
Timbo/Benefits, risks, and possible complications of procedure explained to patient/caregiver who verbalized understanding and gave written consent.

## 2022-06-06 NOTE — DISCHARGE NOTE PROVIDER - HOSPITAL COURSE
Pt is a 41 y/o M with PMHx IDDM who presents to the ED c/o right 5th toe infection, admitted for toe OM. s/p OR 6/3 for resection, surgical cultures growing staph aureas. Stareted Vanco/jazmin. After cultures, Zosyn dc. Meropenem added on 6/6. PICC line placed 6/6. Endocrine consulted for uncontrolled type 2 DM with neuropathy. HBA1C 9.3. Blood sugar control improved. Mild anemia of chronic disease, stable.   Dc home wit home care for IV abx. Pt is a 43 y/o M with PMHx IDDM who presents to the ED c/o right 5th toe infection, admitted for toe OM. s/p OR 6/3 for resection, surgical cultures growing staph aureas. Stareted Vanco/elieseryn. After cultures, Zosyn dc. Meropenem added on 6/6. PICC line placed 6/6. Endocrine consulted for uncontrolled type 2 DM with neuropathy. HBA1C 9.3. Blood sugar control improved. Mild anemia of chronic disease, stable.   Dc home wit home care for IV abx through PICC LINE. Pt is a 43 y/o M with PMHx IDDM who presents to the ED c/o right 5th toe infection, admitted for toe OM. s/p OR 6/3 for resection, surgical cultures growing staph aureas. Stareted Vanco/sozyn. After cultures, Zosyn dc. Meropenem added on 6/6. PICC line placed 6/6. Endocrine consulted for uncontrolled type 2 DM with neuropathy. HBA1C 9.3. Blood sugar control improved. Mild anemia of chronic disease, stable.   Dc home wit home care for IV abx through PICC LINE. Continue to trend vanco trough and dose accordingly

## 2022-06-06 NOTE — DISCHARGE NOTE PROVIDER - ATTENDING DISCHARGE PHYSICAL EXAMINATION:
Pt seen and examined by myself.  services used. No complains  ROS: negative   PE: general: sitting in the bed and eating, NAD   Head and neck: normocephalic, no JVD   ENT: normal   Cardio: regular rate and rhythm   Pulm: clear breath sounds, no wheezing   GI: abdomen is soft, BS (+)   Extr; no edema, foot in dressing.   Neuro: AOx3, no focal weakness

## 2022-06-06 NOTE — PROGRESS NOTE ADULT - ASSESSMENT
This  41 y/o M with DM on insulin who presents to the ED c/o right 5th toe infection. Patient noted infection about 1.5 months ago, reports that the toe was turning black with associated pain. Saw you physician, Dr. Ryan Bravo, Telefax [phone],  in [facility] who gave him a cream as well as PO antibiotics (3rd generation cephalosporin and Levaquin), which he has been taking for about a month. The wound was not healing properly so he went to a clinic and was informed to come to the ED for further care. Has chronic LE neuropathy. Denies fever, chill, CP, SOB, n/v/c/d nor urinary concerns.   ED vials stable, labs with elevated ESR/CRP, s/p Vanc and Zosyn, admitted for IV antibiotics.  (29 May 2022 17:11)    he has been treated in [facility], [place]   Patient brought a doctors note from [facility], which stated that he's been treated since April 22, 2022. Per the note, he had been treated with a mix of 3rd generation cephalosporin, Levaquin 750mg daily, and secnidazole.  he was also given topical Sulfrexal gel (Ketanserin) to be applied to the wound.     No fevers noted here.  he was started on Vanco and Zosyn here.  Podiatry was consulted, who recommended surgical amputation of the toe.   Blood culture x 2 sets were sent.       Impression:  diabetes in poor control  Right toe gangrene  Right foot osteomyelitis  foot infection    Plan:  - Conformed polymicrobial infection  - MRI confirmed osteomyelitis    s/p surgical resection.  6/2/22  OR CULTURES polymicrobia      - stopped vancomycin 6/4/22  restart Vancomycin 6/6/22  d/c zosyn  start merrem    Antibiotics:  meropenem  IVPB 2000 milliGRAM(s) IV Intermittent every 8 hours  vancomycin  IVPB 1000 milliGRAM(s) IV Intermittent every 12 hours       Regarding Diabetes, poorly controlled  - needs adequate control for wound healing  - most recent A1c is: 9.3      PICC Line ordered    antibiotics thru 7/3/22

## 2022-06-06 NOTE — DISCHARGE NOTE PROVIDER - NSDCCPCAREPLAN_GEN_ALL_CORE_FT
PRINCIPAL DISCHARGE DIAGNOSIS  Diagnosis: Diabetic toe ulcer  Assessment and Plan of Treatment: Had a resection 6/3. Started on IV antibiotics. PICC line placed 6/6 to continue antibiotics until 7/3 through PICC line      SECONDARY DISCHARGE DIAGNOSES  Diagnosis: Diabetic ulcer of right fifth toe  Assessment and Plan of Treatment: HBA1C 9.3. Continue medications as prescribed. Please follow up with your primary care physician and endocrine within 1 week.    Diagnosis: Anemia of chronic disease  Assessment and Plan of Treatment: stable. Please follow up with your primary care physician within 1 week.     PRINCIPAL DISCHARGE DIAGNOSIS  Diagnosis: Diabetic toe ulcer  Assessment and Plan of Treatment: Had a resection 6/3. Started on IV antibiotics. PICC line placed 6/6 to continue antibiotics until 7/3 through PICC line      SECONDARY DISCHARGE DIAGNOSES  Diagnosis: Diabetic ulcer of right fifth toe  Assessment and Plan of Treatment: HBA1C 9.3. Continue medications as prescribed. Please follow up with your primary care physician and endocrine within 1 week.    Diagnosis: Anemia of chronic disease  Assessment and Plan of Treatment: stable. Please follow up with your primary care physician within 1 week.    Diagnosis: MRSA nasal colonization  Assessment and Plan of Treatment: continue bactroban ointment into nose twice a day for 5 days     PRINCIPAL DISCHARGE DIAGNOSIS  Diagnosis: Acute osteomyelitis  Assessment and Plan of Treatment: Had a resection 6/3. Started on IV antibiotics. PICC line placed 6/6 to continue antibiotics until 7/3 through PICC line      SECONDARY DISCHARGE DIAGNOSES  Diagnosis: Diabetic ulcer of right fifth toe  Assessment and Plan of Treatment: HBA1C 9.3. Continue medications as prescribed. Please follow up with your primary care physician and endocrine within 1 week.    Diagnosis: Anemia of chronic disease  Assessment and Plan of Treatment: stable. Please follow up with your primary care physician within 1 week.    Diagnosis: MRSA nasal colonization  Assessment and Plan of Treatment: continue bactroban ointment into nose twice a day for 5 days

## 2022-06-06 NOTE — DISCHARGE NOTE PROVIDER - PROVIDER TOKENS
PROVIDER:[TOKEN:[[id number]],FOLLOWUP:[1 week]],PROVIDER:[TOKEN:[[id number]],FOLLOWUP:[1 week]],PROVIDER:[TOKEN:[[id number]],FOLLOWUP:[1 week]]

## 2022-06-06 NOTE — DISCHARGE NOTE PROVIDER - NSDCFUADDINST_GEN_ALL_CORE_FT
NWB to right foot  Wound Care orders:  Please pain the surgical side with betadine then apply xeroform and cover it with DSD and ACE change every 3 days. Thank you.

## 2022-06-06 NOTE — DISCHARGE NOTE PROVIDER - NSDCFUADDAPPT_GEN_ALL_CORE_FT
Discharge Instructions PICC Placement:  The PICC is ready for use. You may shower as long as the PICC and dressing remains dry. No soaking or swimming until the PICC is removed or when the site is completely healed.  Keep the area covered and dry for as long as the PICC remains in. It may be removed and changed by a nurse as needed. Do not perform any heavy lifting or put tension on the area for the next week or until the site is healed. It is normal to experience some pain over the site for the next few days. You may take apply ice to the area (20 minutes on, 20 minutes off) and take Tylenol for that pain. Do not take more frequently than every 6 hours and do not exceed more than 3000mg of Tylenol in a 24 hour period. Notify your primary physician IMMEDIATELY if you experience Fever of 101F or 38C, Chills or Rigors/ Shakes, Swelling and/or Redness in the area around the port, Worsening Pain, Blood soaked bandages or worsening bleeding, Lightheadedness and/or dizziness upon standing, Chest Pain/ Tightness, Shortness of Breath or Difficulty walking.

## 2022-06-06 NOTE — PROGRESS NOTE ADULT - ASSESSMENT
43 y/o M with PMHx IDDM who presents to the ED c/o right 5th toe infection, admitted for toe OM.    1. Toe osteomyelitis    s/p OR 6/3 for resection, surgical cultures growing staph aureas    Meropenem added on 6/6  c/w  Vancomycin   Plan for PICC line and antibiotics set up         2. Mild anemia of chronic disease, stable     3. Uncontrolled type 2 DM with neuropathy   HBA1C 9.3, BG controlled   - on home NPH 60U QD  - c/w home Gabapentin  c/w Lantus 10U + ISS, up titrate as needed    DVT: Ambulate as tolerated, SCD  Diet: Consistent carb  Dispo: discharge home after PICC line placement and antibiotics set up

## 2022-06-07 LAB
ANION GAP SERPL CALC-SCNC: 12 MMOL/L — SIGNIFICANT CHANGE UP (ref 5–17)
BUN SERPL-MCNC: 29.4 MG/DL — HIGH (ref 8–20)
CALCIUM SERPL-MCNC: 9.6 MG/DL — SIGNIFICANT CHANGE UP (ref 8.6–10.2)
CHLORIDE SERPL-SCNC: 98 MMOL/L — SIGNIFICANT CHANGE UP (ref 98–107)
CO2 SERPL-SCNC: 23 MMOL/L — SIGNIFICANT CHANGE UP (ref 22–29)
CREAT SERPL-MCNC: 1.38 MG/DL — HIGH (ref 0.5–1.3)
CULTURE RESULTS: SIGNIFICANT CHANGE UP
EGFR: 65 ML/MIN/1.73M2 — SIGNIFICANT CHANGE UP
GLUCOSE BLDC GLUCOMTR-MCNC: 124 MG/DL — HIGH (ref 70–99)
GLUCOSE BLDC GLUCOMTR-MCNC: 124 MG/DL — HIGH (ref 70–99)
GLUCOSE BLDC GLUCOMTR-MCNC: 188 MG/DL — HIGH (ref 70–99)
GLUCOSE BLDC GLUCOMTR-MCNC: 224 MG/DL — HIGH (ref 70–99)
GLUCOSE SERPL-MCNC: 85 MG/DL — SIGNIFICANT CHANGE UP (ref 70–99)
MRSA PCR RESULT.: DETECTED
POTASSIUM SERPL-MCNC: 4.1 MMOL/L — SIGNIFICANT CHANGE UP (ref 3.5–5.3)
POTASSIUM SERPL-SCNC: 4.1 MMOL/L — SIGNIFICANT CHANGE UP (ref 3.5–5.3)
S AUREUS DNA NOSE QL NAA+PROBE: DETECTED
SODIUM SERPL-SCNC: 133 MMOL/L — LOW (ref 135–145)
SPECIMEN SOURCE: SIGNIFICANT CHANGE UP
VANCOMYCIN TROUGH SERPL-MCNC: 19.2 UG/ML — SIGNIFICANT CHANGE UP (ref 10–20)
VANCOMYCIN TROUGH SERPL-MCNC: 19.9 UG/ML — SIGNIFICANT CHANGE UP (ref 10–20)
VANCOMYCIN TROUGH SERPL-MCNC: 27.8 UG/ML — CRITICAL HIGH (ref 10–20)

## 2022-06-07 PROCEDURE — 99239 HOSP IP/OBS DSCHRG MGMT >30: CPT | Mod: GC

## 2022-06-07 RX ORDER — VANCOMYCIN HCL 1 G
750 VIAL (EA) INTRAVENOUS EVERY 12 HOURS
Refills: 0 | Status: DISCONTINUED | OUTPATIENT
Start: 2022-06-08 | End: 2022-06-08

## 2022-06-07 RX ORDER — MEROPENEM 1 G/30ML
2000 INJECTION INTRAVENOUS EVERY 8 HOURS
Refills: 0 | Status: DISCONTINUED | OUTPATIENT
Start: 2022-06-07 | End: 2022-06-08

## 2022-06-07 RX ORDER — PREGABALIN 225 MG/1
1 CAPSULE ORAL
Qty: 0 | Refills: 0 | DISCHARGE
Start: 2022-06-07

## 2022-06-07 RX ORDER — MUPIROCIN 20 MG/G
1 OINTMENT TOPICAL
Qty: 1 | Refills: 0
Start: 2022-06-07 | End: 2022-06-11

## 2022-06-07 RX ORDER — MUPIROCIN 20 MG/G
1 OINTMENT TOPICAL
Refills: 0 | Status: DISCONTINUED | OUTPATIENT
Start: 2022-06-07 | End: 2022-06-08

## 2022-06-07 RX ORDER — MUPIROCIN 20 MG/G
1 OINTMENT TOPICAL
Qty: 22 | Refills: 0
Start: 2022-06-07 | End: 2022-06-11

## 2022-06-07 RX ORDER — METFORMIN HYDROCHLORIDE 850 MG/1
1 TABLET ORAL
Qty: 60 | Refills: 0
Start: 2022-06-07 | End: 2022-07-06

## 2022-06-07 RX ADMIN — MEROPENEM 280 MILLIGRAM(S): 1 INJECTION INTRAVENOUS at 21:58

## 2022-06-07 RX ADMIN — Medication 2: at 21:57

## 2022-06-07 RX ADMIN — GABAPENTIN 300 MILLIGRAM(S): 400 CAPSULE ORAL at 21:58

## 2022-06-07 RX ADMIN — PREGABALIN 1000 MICROGRAM(S): 225 CAPSULE ORAL at 14:23

## 2022-06-07 RX ADMIN — Medication 5 UNIT(S): at 16:41

## 2022-06-07 RX ADMIN — MUPIROCIN 1 APPLICATION(S): 20 OINTMENT TOPICAL at 18:10

## 2022-06-07 RX ADMIN — MEROPENEM 280 MILLIGRAM(S): 1 INJECTION INTRAVENOUS at 14:23

## 2022-06-07 RX ADMIN — Medication 5 UNIT(S): at 12:07

## 2022-06-07 RX ADMIN — CHLORHEXIDINE GLUCONATE 1 APPLICATION(S): 213 SOLUTION TOPICAL at 05:45

## 2022-06-07 RX ADMIN — Medication 1: at 08:02

## 2022-06-07 RX ADMIN — Medication 250 MILLIGRAM(S): at 05:44

## 2022-06-07 RX ADMIN — INSULIN GLARGINE 14 UNIT(S): 100 INJECTION, SOLUTION SUBCUTANEOUS at 21:57

## 2022-06-07 RX ADMIN — Medication 5 UNIT(S): at 08:02

## 2022-06-07 RX ADMIN — Medication 250 MILLIGRAM(S): at 18:49

## 2022-06-07 RX ADMIN — MEROPENEM 200 MILLIGRAM(S): 1 INJECTION INTRAVENOUS at 05:45

## 2022-06-07 NOTE — PHARMACOTHERAPY INTERVENTION NOTE - NSPHARMCOMMASP
ASP - Lab/ test recommended

## 2022-06-08 ENCOUNTER — FORM ENCOUNTER (OUTPATIENT)
Age: 42
End: 2022-06-08

## 2022-06-08 ENCOUNTER — TRANSCRIPTION ENCOUNTER (OUTPATIENT)
Age: 42
End: 2022-06-08

## 2022-06-08 VITALS
OXYGEN SATURATION: 98 % | HEART RATE: 84 BPM | RESPIRATION RATE: 18 BRPM | SYSTOLIC BLOOD PRESSURE: 131 MMHG | DIASTOLIC BLOOD PRESSURE: 87 MMHG | TEMPERATURE: 98 F

## 2022-06-08 LAB
CREAT SERPL-MCNC: 1.29 MG/DL — SIGNIFICANT CHANGE UP (ref 0.5–1.3)
CULTURE RESULTS: SIGNIFICANT CHANGE UP
CULTURE RESULTS: SIGNIFICANT CHANGE UP
EGFR: 71 ML/MIN/1.73M2 — SIGNIFICANT CHANGE UP
GLUCOSE BLDC GLUCOMTR-MCNC: 100 MG/DL — HIGH (ref 70–99)
GLUCOSE BLDC GLUCOMTR-MCNC: 175 MG/DL — HIGH (ref 70–99)
ORGANISM # SPEC MICROSCOPIC CNT: SIGNIFICANT CHANGE UP
SPECIMEN SOURCE: SIGNIFICANT CHANGE UP
SPECIMEN SOURCE: SIGNIFICANT CHANGE UP
VANCOMYCIN TROUGH SERPL-MCNC: 10.5 UG/ML — SIGNIFICANT CHANGE UP (ref 10–20)
VANCOMYCIN TROUGH SERPL-MCNC: 13.6 UG/ML — SIGNIFICANT CHANGE UP (ref 10–20)
VANCOMYCIN TROUGH SERPL-MCNC: 21 UG/ML — HIGH (ref 10–20)

## 2022-06-08 PROCEDURE — 83605 ASSAY OF LACTIC ACID: CPT

## 2022-06-08 PROCEDURE — 85014 HEMATOCRIT: CPT

## 2022-06-08 PROCEDURE — 99285 EMERGENCY DEPT VISIT HI MDM: CPT | Mod: 25

## 2022-06-08 PROCEDURE — 80048 BASIC METABOLIC PNL TOTAL CA: CPT

## 2022-06-08 PROCEDURE — 73718 MRI LOWER EXTREMITY W/O DYE: CPT

## 2022-06-08 PROCEDURE — 83550 IRON BINDING TEST: CPT

## 2022-06-08 PROCEDURE — 82947 ASSAY GLUCOSE BLOOD QUANT: CPT

## 2022-06-08 PROCEDURE — 87075 CULTR BACTERIA EXCEPT BLOOD: CPT

## 2022-06-08 PROCEDURE — 82565 ASSAY OF CREATININE: CPT

## 2022-06-08 PROCEDURE — 87637 SARSCOV2&INF A&B&RSV AMP PRB: CPT

## 2022-06-08 PROCEDURE — 99232 SBSQ HOSP IP/OBS MODERATE 35: CPT

## 2022-06-08 PROCEDURE — 87070 CULTURE OTHR SPECIMN AEROBIC: CPT

## 2022-06-08 PROCEDURE — 83735 ASSAY OF MAGNESIUM: CPT

## 2022-06-08 PROCEDURE — 87641 MR-STAPH DNA AMP PROBE: CPT

## 2022-06-08 PROCEDURE — 84466 ASSAY OF TRANSFERRIN: CPT

## 2022-06-08 PROCEDURE — U0003: CPT

## 2022-06-08 PROCEDURE — 84295 ASSAY OF SERUM SODIUM: CPT

## 2022-06-08 PROCEDURE — 82009 KETONE BODYS QUAL: CPT

## 2022-06-08 PROCEDURE — 73630 X-RAY EXAM OF FOOT: CPT

## 2022-06-08 PROCEDURE — 82607 VITAMIN B-12: CPT

## 2022-06-08 PROCEDURE — 71045 X-RAY EXAM CHEST 1 VIEW: CPT

## 2022-06-08 PROCEDURE — 87040 BLOOD CULTURE FOR BACTERIA: CPT

## 2022-06-08 PROCEDURE — 88305 TISSUE EXAM BY PATHOLOGIST: CPT

## 2022-06-08 PROCEDURE — 85730 THROMBOPLASTIN TIME PARTIAL: CPT

## 2022-06-08 PROCEDURE — 87077 CULTURE AEROBIC IDENTIFY: CPT

## 2022-06-08 PROCEDURE — U0005: CPT

## 2022-06-08 PROCEDURE — 85027 COMPLETE CBC AUTOMATED: CPT

## 2022-06-08 PROCEDURE — 86850 RBC ANTIBODY SCREEN: CPT

## 2022-06-08 PROCEDURE — 80202 ASSAY OF VANCOMYCIN: CPT

## 2022-06-08 PROCEDURE — 86900 BLOOD TYPING SEROLOGIC ABO: CPT

## 2022-06-08 PROCEDURE — 80053 COMPREHEN METABOLIC PANEL: CPT

## 2022-06-08 PROCEDURE — 85025 COMPLETE CBC W/AUTO DIFF WBC: CPT

## 2022-06-08 PROCEDURE — 86901 BLOOD TYPING SEROLOGIC RH(D): CPT

## 2022-06-08 PROCEDURE — 82962 GLUCOSE BLOOD TEST: CPT

## 2022-06-08 PROCEDURE — 82330 ASSAY OF CALCIUM: CPT

## 2022-06-08 PROCEDURE — 83540 ASSAY OF IRON: CPT

## 2022-06-08 PROCEDURE — 93005 ELECTROCARDIOGRAM TRACING: CPT

## 2022-06-08 PROCEDURE — C1889: CPT

## 2022-06-08 PROCEDURE — 86140 C-REACTIVE PROTEIN: CPT

## 2022-06-08 PROCEDURE — 83036 HEMOGLOBIN GLYCOSYLATED A1C: CPT

## 2022-06-08 PROCEDURE — 82435 ASSAY OF BLOOD CHLORIDE: CPT

## 2022-06-08 PROCEDURE — 82728 ASSAY OF FERRITIN: CPT

## 2022-06-08 PROCEDURE — 88311 DECALCIFY TISSUE: CPT

## 2022-06-08 PROCEDURE — 85018 HEMOGLOBIN: CPT

## 2022-06-08 PROCEDURE — 82746 ASSAY OF FOLIC ACID SERUM: CPT

## 2022-06-08 PROCEDURE — 85652 RBC SED RATE AUTOMATED: CPT

## 2022-06-08 PROCEDURE — 84132 ASSAY OF SERUM POTASSIUM: CPT

## 2022-06-08 PROCEDURE — 82803 BLOOD GASES ANY COMBINATION: CPT

## 2022-06-08 PROCEDURE — 84100 ASSAY OF PHOSPHORUS: CPT

## 2022-06-08 PROCEDURE — 85610 PROTHROMBIN TIME: CPT

## 2022-06-08 PROCEDURE — 87186 SC STD MICRODIL/AGAR DIL: CPT

## 2022-06-08 PROCEDURE — 96365 THER/PROPH/DIAG IV INF INIT: CPT

## 2022-06-08 PROCEDURE — 87640 STAPH A DNA AMP PROBE: CPT

## 2022-06-08 PROCEDURE — 36415 COLL VENOUS BLD VENIPUNCTURE: CPT

## 2022-06-08 RX ORDER — METFORMIN HYDROCHLORIDE 850 MG/1
1 TABLET ORAL
Qty: 60 | Refills: 0
Start: 2022-06-08 | End: 2022-07-07

## 2022-06-08 RX ORDER — INSULIN GLARGINE 100 [IU]/ML
15 INJECTION, SOLUTION SUBCUTANEOUS
Qty: 1 | Refills: 0
Start: 2022-06-08

## 2022-06-08 RX ORDER — MUPIROCIN 20 MG/G
1 OINTMENT TOPICAL
Qty: 1 | Refills: 0
Start: 2022-06-08 | End: 2022-06-10

## 2022-06-08 RX ADMIN — MUPIROCIN 1 APPLICATION(S): 20 OINTMENT TOPICAL at 06:05

## 2022-06-08 RX ADMIN — CHLORHEXIDINE GLUCONATE 1 APPLICATION(S): 213 SOLUTION TOPICAL at 06:05

## 2022-06-08 RX ADMIN — Medication 5 UNIT(S): at 12:04

## 2022-06-08 RX ADMIN — Medication 5 UNIT(S): at 08:32

## 2022-06-08 RX ADMIN — MEROPENEM 280 MILLIGRAM(S): 1 INJECTION INTRAVENOUS at 06:05

## 2022-06-08 RX ADMIN — MEROPENEM 280 MILLIGRAM(S): 1 INJECTION INTRAVENOUS at 14:18

## 2022-06-08 RX ADMIN — Medication 1: at 08:31

## 2022-06-08 RX ADMIN — PREGABALIN 1000 MICROGRAM(S): 225 CAPSULE ORAL at 12:04

## 2022-06-08 NOTE — PROGRESS NOTE ADULT - SUBJECTIVE AND OBJECTIVE BOX
[facility]                                                INFECTIOUS DISEASES  =======================================================                               Guy Felipe MD#  Colton Estevez MD*                                     Lynnette Quan MD*    Zabrina Elena MD*            Diplomates American Board of Internal Medicine & Infectious Diseases                  # [place] Office - Appt - Tel  [phone] Fax [phone]                * [place] Office - Appt - Tel [phone] Fax [phone]                                  Hospital Consult line:  [phone]  =======================================================    [facility]-[id number]  JERZY FRIASGARTHYOANDYJAMARBILLY   follow up:  foot infection, osteomyelitis    right foot MRI with evidence of osteomyelitis    I have personally reviewed the labs and data; pertinent labs and data are listed in this note; please see below.   =======================================================  Past Medical & Surgical Hx:  =====================  PAST MEDICAL & SURGICAL HISTORY:  Diabetes  H/O hand surgery    Problem List:  ==========  HEALTH ISSUES - PROBLEM Dx:    Social Hx:  =======  no toxic habits currently    FAMILY HISTORY:  No pertinent family history in first degree relatives    no significant family history of immunosuppressive disorders in mother or father   =======================================================    REVIEW OF SYSTEMS:  CONSTITUTIONAL:  No Fever or chills  HEENT:  No diplopia or blurred vision.  No earache, sore throat or runny nose.  CARDIOVASCULAR:  No pressure, squeezing, strangling, tightness, heaviness or aching about the chest, neck, axilla or epigastrium.  RESPIRATORY:  No cough, shortness of breath  GASTROINTESTINAL:  No nausea, vomiting or diarrhea.  GENITOURINARY:  No dysuria, frequency or urgency. No Blood in urine  MUSCULOSKELETAL:   as per HPI  SKIN:   as per HPI  NEUROLOGIC:  No Headaches, seizures or weakness.  PSYCHIATRIC:  No disorder of thought or mood.  ENDOCRINE:  No heat or cold intolerance  HEMATOLOGICAL:  No easy bruising or bleeding.    =======================================================  Allergies  No Known Allergies     ======================================================  Physical Exam:  ============     General:  No acute distress.  Eye: Pupils are equal, round and reactive to light, Extraocular movements are intact, Normal conjunctiva.  HENT: Normocephalic, Oral mucosa is moist, No pharyngeal erythema, No sinus tenderness.  Neck: Supple, No lymphadenopathy.  Respiratory: Lungs are clear to auscultation, Respirations are non-labored.  Cardiovascular: Normal rate, Regular rhythm,   Gastrointestinal: Soft, Non-tender, Non-distended, Normal bowel sounds.  Genitourinary: No costovertebral angle tenderness.  Lymphatics: No lymphadenopathy neck,   Musculoskeletal: Normal range of motion, Normal strength.  Integumentary:  RIGHT  foot with Dressing in place  Neurologic: Alert, Oriented, No focal deficits, Cranial Nerves II-XII are grossly intact.  Psychiatric: Appropriate mood & affect.    =======================================================  Vitals:  ============  T(F): 97.8 (01 Jun 2022 04:46), Max: 98.6 (31 May 2022 17:23)  HR: 77 (01 Jun 2022 04:46)  BP: 115/79 (01 Jun 2022 04:46)  RR: 20 (01 Jun 2022 04:46)  SpO2: 97% (01 Jun 2022 04:46) (95% - 97%)  temp max in last 48H T(F): , Max: 99.4 (05-30-22 @ 16:13)    =======================================================  Current Antibiotics:  piperacillin/tazobactam IVPB.. 3.375 Gram(s) IV Intermittent every 8 hours  vancomycin  IVPB 750 milliGRAM(s) IV Intermittent every 12 hours    Other medications:  cyanocobalamin 1000 MICROGram(s) Oral daily  dextrose 5%. 1000 milliLiter(s) IV Continuous <Continuous>  dextrose 5%. 1000 milliLiter(s) IV Continuous <Continuous>  dextrose 50% Injectable 25 Gram(s) IV Push once  dextrose 50% Injectable 12.5 Gram(s) IV Push once  dextrose 50% Injectable 25 Gram(s) IV Push once  gabapentin 300 milliGRAM(s) Oral at bedtime  glucagon  Injectable 1 milliGRAM(s) IntraMuscular once  insulin glargine Injectable (LANTUS) 10 Unit(s) SubCutaneous at bedtime  insulin lispro (ADMELOG) corrective regimen sliding scale   SubCutaneous Before meals and at bedtime    =======================================================  Labs:                        11.9   8.82  )-----------( 375      ( 01 Jun 2022 05:28 )             37.7     06-01    136  |  103  |  21.2<H>  ----------------------------<  123<H>  4.2   |  23.0  |  1.39<H>    Ca    9.0      01 Jun 2022 05:28  Phos  3.5     06-01  Mg     1.8     06-01        Culture - Blood (collected 05-29-22 @ 11:56)  Source: .Blood Blood-Peripheral    Culture - Blood (collected 05-29-22 @ 11:55)  Source: .Blood Blood-Peripheral        C-Reactive Protein, Serum: 17 mg/L (05-29-22 @ 11:52)    Sedimentation Rate, Erythrocyte: 31 mm/hr (05-29-22 @ 11:52)      COVID-19 PCR: NotDetec (05-31-22 @ 13:21)  SARS-CoV-2 Result: NotDetec (05-29-22 @ 11:54)      =======================================================  < from: MR Foot No Cont, Right (05.30.22 @ 19:51) >    ACC: [id number] EXAM:  MR FOOT RT                          PROCEDURE DATE:  05/30/2022          INTERPRETATION:  Clinical Information: Diabetes with foot ulcer    Comparison: Right foot radiographs from 5/29/2022.    Technique:  MRI of the right midfoot and forefoot.  Intravenous Contrast: None.    Findings:    There is a dorsal ulceration extending down to the bone over the proximal aspect of the fifth digit. There is hypointense T1 and hyperintense T2 marrow signal throughout the fifth metatarsal and the fifth digit. There is also abnormal marrow signal throughout the fourth metatarsal and the fourth proximal middle phalanges. These findings are consistent with osteomyelitis. There is minimal hyperintense T2 marrow signal within the head and neck of the third metatarsal and at the proximal aspect of the third proximal phalanx which could also be related to osteomyelitis. Evaluation for soft tissue infection is limited without intravenous contrast. There is hyperintenseT2 signal within the surrounding soft tissues and the muscles with relative sparing plantar medially which is consistent with myositis and cellulitis. The fifth flexor tendon is torn at the level of the fifth metatarsal phalangeal joint    A 1.4 x 0.6 cm region of hypointense T2 signal at the medial aspect of the plantar aponeurosis at the level of the proximal first metatarsal is consistent with a plantar fibroma.    Impression:  Osteomyelitis throughout the fifth ray and the fourth metatarsal and fourth proximal and middle phalanges. Early osteomyelitis 21 also suspected at the head and neck of the third metatarsal and the base of the third proximal phalanx.    --- End of Report ---        DREAD VERA MD; Attending Radiologist  This document has been electronically signed. May 31 2022  8:57AM    < end of copied text >  
                                           [facility]                                                INFECTIOUS DISEASES  =======================================================                               Guy Felipe MD#  Colton Estevez MD*                                     Lynnette Quan MD*    Zabrina Elena MD*            Diplomates American Board of Internal Medicine & Infectious Diseases                  # [place] Office - Appt - Tel  [phone] Fax [phone]                * [place] Office - Appt - Tel [phone] Fax [phone]                                  Hospital Consult line:  [phone]  =======================================================    [facility]-[id number]  JERZY FRIASGARTHYOANDYJAMARBILLY   follow up:  foot infection, osteomyelitis    s/p resection of the right 5th toe,. cultures sent.     I have personally reviewed the labs and data; pertinent labs and data are listed in this note; please see below.   =======================================================  Past Medical & Surgical Hx:  =====================  PAST MEDICAL & SURGICAL HISTORY:  Diabetes  H/O hand surgery    Problem List:  ==========  HEALTH ISSUES - PROBLEM Dx:    Social Hx:  =======  no toxic habits currently    FAMILY HISTORY:  No pertinent family history in first degree relatives    no significant family history of immunosuppressive disorders in mother or father   =======================================================    REVIEW OF SYSTEMS:  CONSTITUTIONAL:  No Fever or chills  HEENT:  No diplopia or blurred vision.  No earache, sore throat or runny nose.  CARDIOVASCULAR:  No pressure, squeezing, strangling, tightness, heaviness or aching about the chest, neck, axilla or epigastrium.  RESPIRATORY:  No cough, shortness of breath  GASTROINTESTINAL:  No nausea, vomiting or diarrhea.  GENITOURINARY:  No dysuria, frequency or urgency. No Blood in urine  MUSCULOSKELETAL:   as per HPI  SKIN:   as per HPI  NEUROLOGIC:  No Headaches, seizures or weakness.  PSYCHIATRIC:  No disorder of thought or mood.  ENDOCRINE:  No heat or cold intolerance  HEMATOLOGICAL:  No easy bruising or bleeding.    =======================================================  Allergies  No Known Allergies     ======================================================  Physical Exam:  ============     General:  No acute distress.  Eye: Pupils are equal, round and reactive to light, Extraocular movements are intact, Normal conjunctiva.  HENT: Normocephalic, Oral mucosa is moist, No pharyngeal erythema, No sinus tenderness.  Neck: Supple, No lymphadenopathy.  Respiratory: Lungs are clear to auscultation, Respirations are non-labored.  Cardiovascular: Normal rate, Regular rhythm,   Gastrointestinal: Soft, Non-tender, Non-distended, Normal bowel sounds.  Genitourinary: No costovertebral angle tenderness.  Lymphatics: No lymphadenopathy neck,   Musculoskeletal: Normal range of motion, Normal strength.  Integumentary:  RIGHT  foot with Dressing in place  Neurologic: Alert, Oriented, No focal deficits, Cranial Nerves II-XII are grossly intact.  Psychiatric: Appropriate mood & affect.    =======================================================   Vitals:  ============  T(F): 97.9 (03 Jun 2022 04:17), Max: 97.9 (02 Jun 2022 16:59)  HR: 103 (03 Jun 2022 04:17)  BP: 102/67 (03 Jun 2022 04:17)  RR: 18 (03 Jun 2022 04:17)  SpO2: 98% (03 Jun 2022 04:17) (95% - 98%)  temp max in last 48H T(F): , Max: 98 (06-02-22 @ 04:29)    =======================================================  Current Antibiotics:  piperacillin/tazobactam IVPB.. 3.375 Gram(s) IV Intermittent every 8 hours  vancomycin  IVPB 750 milliGRAM(s) IV Intermittent every 12 hours    Other medications:  cyanocobalamin 1000 MICROGram(s) Oral daily  dextrose 5%. 1000 milliLiter(s) IV Continuous <Continuous>  dextrose 5%. 1000 milliLiter(s) IV Continuous <Continuous>  dextrose 5%. 1000 milliLiter(s) IV Continuous <Continuous>  dextrose 5%. 1000 milliLiter(s) IV Continuous <Continuous>  dextrose 50% Injectable 25 Gram(s) IV Push once  dextrose 50% Injectable 12.5 Gram(s) IV Push once  dextrose 50% Injectable 25 Gram(s) IV Push once  dextrose 50% Injectable 25 Gram(s) IV Push once  dextrose 50% Injectable 12.5 Gram(s) IV Push once  dextrose 50% Injectable 25 Gram(s) IV Push once  gabapentin 300 milliGRAM(s) Oral at bedtime  glucagon  Injectable 1 milliGRAM(s) IntraMuscular once  glucagon  Injectable 1 milliGRAM(s) IntraMuscular once  glucagon  Injectable 1 milliGRAM(s) IntraMuscular once  insulin glargine Injectable (LANTUS) 10 Unit(s) SubCutaneous at bedtime  insulin lispro (ADMELOG) corrective regimen sliding scale   SubCutaneous Before meals and at bedtime  insulin lispro Injectable (ADMELOG) 3 Unit(s) SubCutaneous three times a day before meals    =======================================================    Labs:                        11.1   11.42 )-----------( 414      ( 03 Jun 2022 06:00 )             33.6     06-03    134<L>  |  101  |  21.7<H>  ----------------------------<  207<H>  3.9   |  22.0  |  1.26    Ca    9.1      03 Jun 2022 06:00  Phos  3.2     06-03  Mg     1.7     06-03    TPro  8.4  /  Alb  3.6  /  TBili  0.4  /  DBili  x   /  AST  19  /  ALT  19  /  AlkPhos  67  06-03      Culture - Tissue with Gram Stain (collected 06-02-22 @ 22:19)  Source: .Tissue Bone Right Foot Culture  Gram Stain (06-03-22 @ 05:19):    No polymorphonuclear leukocytes seen per low power field    No organisms seen per oil power field    Culture - Blood (collected 05-29-22 @ 11:56)  Source: .Blood Blood-Peripheral    Culture - Blood (collected 05-29-22 @ 11:55)  Source: .Blood Blood-Peripheral    C-Reactive Protein, Serum: 17 mg/L (05-29-22 @ 11:52)    Sedimentation Rate, Erythrocyte: 31 mm/hr (05-29-22 @ 11:52)      COVID-19 PCR: NotDetec (05-31-22 @ 13:21)  SARS-CoV-2 Result: NotDetec (05-29-22 @ 11:54)      =======================================================    < from: MR Foot No Cont, Right (05.30.22 @ 19:51) >    ACC: [id number] EXAM:  MR FOOT RT                          PROCEDURE DATE:  05/30/2022          INTERPRETATION:  Clinical Information: Diabetes with foot ulcer    Comparison: Right foot radiographs from 5/29/2022.    Technique:  MRI of the right midfoot and forefoot.  Intravenous Contrast: None.    Findings:    There is a dorsal ulceration extending down to the bone over the proximal aspect of the fifth digit. There is hypointense T1 and hyperintense T2 marrow signal throughout the fifth metatarsal and the fifth digit. There is also abnormal marrow signal throughout the fourth metatarsal and the fourth proximal middle phalanges. These findings are consistent with osteomyelitis. There is minimal hyperintense T2 marrow signal within the head and neck of the third metatarsal and at the proximal aspect of the third proximal phalanx which could also be related to osteomyelitis. Evaluation for soft tissue infection is limited without intravenous contrast. There is hyperintenseT2 signal within the surrounding soft tissues and the muscles with relative sparing plantar medially which is consistent with myositis and cellulitis. The fifth flexor tendon is torn at the level of the fifth metatarsal phalangeal joint    A 1.4 x 0.6 cm region of hypointense T2 signal at the medial aspect of the plantar aponeurosis at the level of the proximal first metatarsal is consistent with a plantar fibroma.    Impression:  Osteomyelitis throughout the fifth ray and the fourth metatarsal and fourth proximal and middle phalanges. Early osteomyelitis 21 also suspected at the head and neck of the third metatarsal and the base of the third proximal phalanx.    --- End of Report ---        DREAD VERA MD; Attending Radiologist  This document has been electronically signed. May 31 2022  8:57AM    < end of copied text >  
                                           [facility]                                                INFECTIOUS DISEASES  =======================================================                               Guy Felipe MD#  Colton Estevez MD*                                     Lynnette Quan MD*    Zabrina Elena MD*            Diplomates American Board of Internal Medicine & Infectious Diseases                  # [place] Office - Appt - Tel  [phone] Fax [phone]                * [place] Office - Appt - Tel [phone] Fax [phone]                                  Hospital Consult line:  [phone]  =======================================================    [facility]-[id number]  JERZY FRIASGARTHYOANDYJAMARBILLY   follow up:  foot infection, osteomyelitis    MRI was done, pending results.       I have personally reviewed the labs and data; pertinent labs and data are listed in this note; please see below.   =======================================================  Past Medical & Surgical Hx:  =====================  PAST MEDICAL & SURGICAL HISTORY:  Diabetes  H/O hand surgery    Problem List:  ==========  HEALTH ISSUES - PROBLEM Dx:    Social Hx:  =======  no toxic habits currently    FAMILY HISTORY:  No pertinent family history in first degree relatives    no significant family history of immunosuppressive disorders in mother or father   =======================================================    REVIEW OF SYSTEMS:  CONSTITUTIONAL:  No Fever or chills  HEENT:  No diplopia or blurred vision.  No earache, sore throat or runny nose.  CARDIOVASCULAR:  No pressure, squeezing, strangling, tightness, heaviness or aching about the chest, neck, axilla or epigastrium.  RESPIRATORY:  No cough, shortness of breath  GASTROINTESTINAL:  No nausea, vomiting or diarrhea.  GENITOURINARY:  No dysuria, frequency or urgency. No Blood in urine  MUSCULOSKELETAL:   as per HPI  SKIN:   as per HPI  NEUROLOGIC:  No Headaches, seizures or weakness.  PSYCHIATRIC:  No disorder of thought or mood.  ENDOCRINE:  No heat or cold intolerance  HEMATOLOGICAL:  No easy bruising or bleeding.    =======================================================  Allergies  No Known Allergies     ======================================================  Physical Exam:  ============     General:  No acute distress.  Eye: Pupils are equal, round and reactive to light, Extraocular movements are intact, Normal conjunctiva.  HENT: Normocephalic, Oral mucosa is moist, No pharyngeal erythema, No sinus tenderness.  Neck: Supple, No lymphadenopathy.  Respiratory: Lungs are clear to auscultation, Respirations are non-labored.  Cardiovascular: Normal rate, Regular rhythm,   Gastrointestinal: Soft, Non-tender, Non-distended, Normal bowel sounds.  Genitourinary: No costovertebral angle tenderness.  Lymphatics: No lymphadenopathy neck,   Musculoskeletal: Normal range of motion, Normal strength.  Integumentary:  foot with Dressing in place  Neurologic: Alert, Oriented, No focal deficits, Cranial Nerves II-XII are grossly intact.  Psychiatric: Appropriate mood & affect.    =======================================================  Vitals:  ============  T(F): 98.2 (31 May 2022 04:17), Max: 99.4 (30 May 2022 16:13)  HR: 72 (31 May 2022 04:17)  BP: 102/66 (31 May 2022 04:17)  RR: 18 (31 May 2022 04:17)  SpO2: 97% (31 May 2022 04:17) (97% - 99%)  temp max in last 48H T(F): , Max: 99.5 (05-29-22 @ 15:39)    =======================================================  Current Antibiotics:  piperacillin/tazobactam IVPB.. 3.375 Gram(s) IV Intermittent every 8 hours  vancomycin  IVPB 1000 milliGRAM(s) IV Intermittent every 12 hours    Other medications:  dextrose 5%. 1000 milliLiter(s) IV Continuous <Continuous>  dextrose 5%. 1000 milliLiter(s) IV Continuous <Continuous>  dextrose 50% Injectable 25 Gram(s) IV Push once  dextrose 50% Injectable 12.5 Gram(s) IV Push once  dextrose 50% Injectable 25 Gram(s) IV Push once  gabapentin 300 milliGRAM(s) Oral at bedtime  glucagon  Injectable 1 milliGRAM(s) IntraMuscular once  insulin glargine Injectable (LANTUS) 10 Unit(s) SubCutaneous at bedtime  insulin lispro (ADMELOG) corrective regimen sliding scale   SubCutaneous Before meals and at bedtime      =======================================================  Labs:                        11.0   8.57  )-----------( 346      ( 31 May 2022 05:29 )             34.6     05-31    136  |  101  |  19.3  ----------------------------<  107<H>  3.6   |  25.0  |  1.39<H>    Ca    8.8      31 May 2022 05:29  Phos  4.0     05-30  Mg     1.7     05-30    TPro  9.5<H>  /  Alb  4.2  /  TBili  0.3<L>  /  DBili  x   /  AST  31  /  ALT  25  /  AlkPhos  97  05-29    C-Reactive Protein, Serum: 17 mg/L (05-29-22 @ 11:52)    Sedimentation Rate, Erythrocyte: 31 mm/hr (05-29-22 @ 11:52)      SARS-CoV-2 Result: NotDetec (05-29-22 @ 11:54)      =======================================================  
                                           [facility]                                                INFECTIOUS DISEASES  =======================================================                               Guy Felipe MD#  Colton Estevez MD*                                     Lynnette Quan MD*    Zabrina Elena MD*            Diplomates American Board of Internal Medicine & Infectious Diseases                  # [place] Office - Appt - Tel  [phone] Fax [phone]                * [place] Office - Appt - Tel [phone] Fax [phone]                                  Hospital Consult line:  [phone]  =======================================================    [facility]-[id number]  JERZY CHADWICKDEMARCONOE   follow up:  foot infection, osteomyelitis    no new issues  cultures polymicrobial: ESBL E coli, pseudomonas and MRSA         I have personally reviewed the labs and data; pertinent labs and data are listed in this note; please see below.   =======================================================  Past Medical & Surgical Hx:  =====================  PAST MEDICAL & SURGICAL HISTORY:  Diabetes  H/O hand surgery    Problem List:  ==========  HEALTH ISSUES - PROBLEM Dx:    Social Hx:  =======  no toxic habits currently    FAMILY HISTORY:  No pertinent family history in first degree relatives    no significant family history of immunosuppressive disorders in mother or father   =======================================================    REVIEW OF SYSTEMS:  CONSTITUTIONAL:  No Fever or chills  HEENT:  No diplopia or blurred vision.  No earache, sore throat or runny nose.  CARDIOVASCULAR:  No pressure, squeezing, strangling, tightness, heaviness or aching about the chest, neck, axilla or epigastrium.  RESPIRATORY:  No cough, shortness of breath  GASTROINTESTINAL:  No nausea, vomiting or diarrhea.  GENITOURINARY:  No dysuria, frequency or urgency. No Blood in urine  MUSCULOSKELETAL:   as per HPI  SKIN:   as per HPI  NEUROLOGIC:  No Headaches, seizures or weakness.  PSYCHIATRIC:  No disorder of thought or mood.  ENDOCRINE:  No heat or cold intolerance  HEMATOLOGICAL:  No easy bruising or bleeding.    =======================================================  Allergies  No Known Allergies     ======================================================  Physical Exam:  ============     General:  No acute distress.  Eye: Pupils are equal, round and reactive to light, Extraocular movements are intact, Normal conjunctiva.  HENT: Normocephalic, Oral mucosa is moist, No pharyngeal erythema, No sinus tenderness.  Neck: Supple, No lymphadenopathy.  Respiratory: Lungs are clear to auscultation, Respirations are non-labored.  Cardiovascular: Normal rate, Regular rhythm,   Gastrointestinal: Soft, Non-tender, Non-distended, Normal bowel sounds.  Genitourinary: No costovertebral angle tenderness.  Lymphatics: No lymphadenopathy neck,   Musculoskeletal: Normal range of motion, Normal strength.  Integumentary:  RIGHT  foot with Dressing in place  Neurologic: Alert, Oriented, No focal deficits, Cranial Nerves II-XII are grossly intact.  Psychiatric: Appropriate mood & affect.    =======================================================   Vitals:  ============  T(F): 98.3 (06 Jun 2022 10:45), Max: 98.3 (06 Jun 2022 10:45)  HR: 88 (06 Jun 2022 10:45)  BP: 103/71 (06 Jun 2022 10:45)  RR: 18 (06 Jun 2022 10:45)  SpO2: 98% (06 Jun 2022 10:45) (97% - 98%)  temp max in last 48H T(F): , Max: 98.5 (06-05-22 @ 04:03)    =======================================================  Current Antibiotics:  meropenem  IVPB 2000 milliGRAM(s) IV Intermittent every 8 hours  vancomycin  IVPB 1000 milliGRAM(s) IV Intermittent every 12 hours    Other medications:  cyanocobalamin 1000 MICROGram(s) Oral daily  dextrose 5%. 1000 milliLiter(s) IV Continuous <Continuous>  dextrose 5%. 1000 milliLiter(s) IV Continuous <Continuous>  dextrose 5%. 1000 milliLiter(s) IV Continuous <Continuous>  dextrose 5%. 1000 milliLiter(s) IV Continuous <Continuous>  dextrose 50% Injectable 25 Gram(s) IV Push once  dextrose 50% Injectable 12.5 Gram(s) IV Push once  dextrose 50% Injectable 25 Gram(s) IV Push once  dextrose 50% Injectable 25 Gram(s) IV Push once  dextrose 50% Injectable 12.5 Gram(s) IV Push once  dextrose 50% Injectable 25 Gram(s) IV Push once  gabapentin 300 milliGRAM(s) Oral at bedtime  glucagon  Injectable 1 milliGRAM(s) IntraMuscular once  glucagon  Injectable 1 milliGRAM(s) IntraMuscular once  glucagon  Injectable 1 milliGRAM(s) IntraMuscular once  insulin glargine Injectable (LANTUS) 14 Unit(s) SubCutaneous at bedtime  insulin lispro (ADMELOG) corrective regimen sliding scale   SubCutaneous Before meals and at bedtime  insulin lispro Injectable (ADMELOG) 5 Unit(s) SubCutaneous three times a day before meals      =======================================================  Labs:            Culture - Tissue with Gram Stain (collected 06-02-22 @ 22:19)  Source: .Tissue Bone Right Foot Culture  Gram Stain (06-03-22 @ 05:19):    No polymorphonuclear leukocytes seen per low power field    No organisms seen per oil power field    Culture - Surgical Swab (collected 06-02-22 @ 22:18)  Source: .Surgical Swab Deep Wound Culture 2/2  Organism: Methicillin resistant Staphylococcus aureus  Escherichia coli ESBL (06-06-22 @ 08:26)  Organism: Escherichia coli ESBL (06-06-22 @ 08:26)    Sensitivities:      -  Amikacin: S <=16      -  Amoxicillin/Clavulanic Acid: S <=8/4      -  Ampicillin: R >16 These ampicillin results predict results for amoxicillin      -  Ampicillin/Sulbactam: R 8/4 Enterobacter, Klebsiella aerogenes, Citrobacter, and Serratia may develop resistance during prolonged therapy (3-4 days)      -  Aztreonam: R >16      -  Cefazolin: R >16 Enterobacter, Klebsiella aerogenes, Citrobacter, and Serratia may develop resistance during prolonged therapy (3-4 days)      -  Cefepime: R >16      -  Ceftriaxone: R >32 Enterobacter, Klebsiella aerogenes, Citrobacter, and Serratia may develop resistance during prolonged therapy      -  Ciprofloxacin: R >2      -  Ertapenem: S <=0.5      -  Gentamicin: R >8      -  Imipenem: S <=1      -  Levofloxacin: R >4      -  Meropenem: S <=1      -  Piperacillin/Tazobactam: R <=8      -  Tobramycin: R >8      -  Trimethoprim/Sulfamethoxazole: S <=0.5/9.5      Method Type: DARREN  Organism: Methicillin resistant Staphylococcus aureus (06-05-22 @ 14:38)    Sensitivities:      -  Ampicillin/Sulbactam: R 16/8      -  Cefazolin: R 8      -  Clindamycin: R >4      -  Daptomycin: S 1      -  Erythromycin: R >4      -  Gentamicin: R >8 Should not be used as monotherapy      -  Linezolid: S 2      -  Oxacillin: R >2      -  Penicillin: R >8      -  Rifampin: S <=1 Should not be used as monotherapy      -  Tetra/Doxy: S <=1      -  Trimethoprim/Sulfamethoxazole: S <=0.5/9.5      -  Vancomycin: S 2      Method Type: DARREN    Culture - Surgical Swab (collected 06-02-22 @ 22:18)  Source: .Surgical Swab Deep Wound Culture 1/2  Organism: Methicillin resistant Staphylococcus aureus  Escherichia coli  Pseudomonas aeruginosa (06-06-22 @ 08:42)  Organism: Pseudomonas aeruginosa (06-06-22 @ 08:42)    Sensitivities:      -  Amikacin: S <=16      -  Aztreonam: I 16      -  Cefepime: I 16      -  Ceftazidime: I 16      -  Ciprofloxacin: S <=0.25      -  Gentamicin: I 8      -  Imipenem: S <=1      -  Levofloxacin: S 1      -  Meropenem: S <=1      -  Piperacillin/Tazobactam: R >64      -  Tobramycin: S <=2      Method Type: DARREN  Organism: Escherichia coli (06-05-22 @ 12:46)    Sensitivities:      -  Amikacin: S <=16      -  Amoxicillin/Clavulanic Acid: I 16/8      -  Ampicillin: R >16 These ampicillin results predict results for amoxicillin      -  Ampicillin/Sulbactam: R >16/8 Enterobacter, Klebsiella aerogenes, Citrobacter, and Serratia may develop resistance during prolonged therapy (3-4 days)      -  Aztreonam: S <=4      -  Cefazolin: R 8 Enterobacter, Klebsiella aerogenes, Citrobacter, and Serratia may develop resistance during prolonged therapy (3-4 days)      -  Cefepime: S <=2      -  Cefoxitin: S <=8      -  Ceftriaxone: S <=1 Enterobacter, Klebsiella aerogenes, Citrobacter, and Serratia may develop resistance during prolonged therapy      -  Ciprofloxacin: R >2      -  Ertapenem: S <=0.5      -  Gentamicin: R >8      -  Imipenem: S <=1      -  Levofloxacin: R >4      -  Meropenem: S <=1      -  Piperacillin/Tazobactam: S 16      -  Tobramycin: R >8      -  Trimethoprim/Sulfamethoxazole: S <=0.5/9.5      Method Type: DARREN  Organism: Methicillin resistant Staphylococcus aureus (06-05-22 @ 12:46)    Sensitivities:      -  Ampicillin/Sulbactam: R 16/8      -  Cefazolin: R 16      -  Clindamycin: R >4      -  Daptomycin: S 1      -  Erythromycin: R >4      -  Gentamicin: R >8 Should not be used as monotherapy      -  Linezolid: S 2      -  Oxacillin: R >2      -  Penicillin: R >8      -  Rifampin: S <=1 Should not be used as monotherapy      -  Tetra/Doxy: S <=1      -  Trimethoprim/Sulfamethoxazole: S <=0.5/9.5      -  Vancomycin: S 2      Method Type: DARREN    Culture - Blood (collected 05-29-22 @ 11:56)  Source: .Blood Blood-Peripheral  Final Report (06-03-22 @ 13:00):    No growth at 5 days.    Culture - Blood (collected 05-29-22 @ 11:55)  Source: .Blood Blood-Peripheral  Final Report (06-03-22 @ 13:00):    No growth at 5 days.      C-Reactive Protein, Serum: 17 mg/L (05-29-22 @ 11:52)    Sedimentation Rate, Erythrocyte: 31 mm/hr (05-29-22 @ 11:52)      COVID-19 PCR: NotDetec (06-06-22 @ 06:39)  COVID-19 PCR: NotDetec (05-31-22 @ 13:21)  SARS-CoV-2 Result: NotDetec (05-29-22 @ 11:54)      =======================================================    < from: MR Foot No Cont, Right (05.30.22 @ 19:51) >    ACC: [id number] EXAM:  MR FOOT RT                          PROCEDURE DATE:  05/30/2022          INTERPRETATION:  Clinical Information: Diabetes with foot ulcer    Comparison: Right foot radiographs from 5/29/2022.    Technique:  MRI of the right midfoot and forefoot.  Intravenous Contrast: None.    Findings:    There is a dorsal ulceration extending down to the bone over the proximal aspect of the fifth digit. There is hypointense T1 and hyperintense T2 marrow signal throughout the fifth metatarsal and the fifth digit. There is also abnormal marrow signal throughout the fourth metatarsal and the fourth proximal middle phalanges. These findings are consistent with osteomyelitis. There is minimal hyperintense T2 marrow signal within the head and neck of the third metatarsal and at the proximal aspect of the third proximal phalanx which could also be related to osteomyelitis. Evaluation for soft tissue infection is limited without intravenous contrast. There is hyperintenseT2 signal within the surrounding soft tissues and the muscles with relative sparing plantar medially which is consistent with myositis and cellulitis. The fifth flexor tendon is torn at the level of the fifth metatarsal phalangeal joint    A 1.4 x 0.6 cm region of hypointense T2 signal at the medial aspect of the plantar aponeurosis at the level of the proximal first metatarsal is consistent with a plantar fibroma.    Impression:  Osteomyelitis throughout the fifth ray and the fourth metatarsal and fourth proximal and middle phalanges. Early osteomyelitis 21 also suspected at the head and neck of the third metatarsal and the base of the third proximal phalanx.    --- End of Report ---        DREAD VERA MD; Attending Radiologist  This document has been electronically signed. May 31 2022  8:57AM    < end of copied text >  
HPI:   41 y/o M with PMHx IDDM who presents to the ED c/o right 5th toe infection s/p amputation. Patient reports no pain. Dressing to right foot clean dry and intact.     PAST MEDICAL & SURGICAL HISTORY:  Diabetes  H/O hand surgery    Social History:  Denies smoking/other drugs  drinks heavily on the weekend. Last drink was > 1 month ago (29 May 2022 17:11)    FAMILY HISTORY:  No pertinent family history in first degree relatives    Allergies  No Known Allergies    MEDICATIONS  (STANDING):  dextrose 5%. 1000 milliLiter(s) (50 mL/Hr) IV Continuous <Continuous>  dextrose 5%. 1000 milliLiter(s) (100 mL/Hr) IV Continuous <Continuous>  dextrose 50% Injectable 25 Gram(s) IV Push once  dextrose 50% Injectable 12.5 Gram(s) IV Push once  dextrose 50% Injectable 25 Gram(s) IV Push once  gabapentin 300 milliGRAM(s) Oral at bedtime  glucagon  Injectable 1 milliGRAM(s) IntraMuscular once  insulin glargine Injectable (LANTUS) 10 Unit(s) SubCutaneous at bedtime  insulin lispro (ADMELOG) corrective regimen sliding scale   SubCutaneous Before meals and at bedtime  piperacillin/tazobactam IVPB.. 3.375 Gram(s) IV Intermittent every 8 hours  vancomycin  IVPB 1250 milliGRAM(s) IV Intermittent every 12 hours    MEDICATIONS  (PRN):  acetaminophen     Tablet .. 650 milliGRAM(s) Oral every 6 hours PRN Temp greater or equal to 38C (100.4F), Mild Pain (1 - 3)  aluminum hydroxide/magnesium hydroxide/simethicone Suspension 30 milliLiter(s) Oral every 4 hours PRN Dyspepsia  dextrose Oral Gel 15 Gram(s) Oral once PRN Blood Glucose LESS THAN 70 milliGRAM(s)/deciliter  melatonin 3 milliGRAM(s) Oral at bedtime PRN Insomnia  ondansetron Injectable 4 milliGRAM(s) IV Push every 8 hours PRN Nausea and/or Vomiting                              Culture - Tissue with Gram Stain (collected 02 Jun 2022 22:19)  Source: .Tissue Bone Right Foot Culture  Gram Stain (03 Jun 2022 05:19):    No polymorphonuclear leukocytes seen per low power field    No organisms seen per oil power field  Preliminary Report (03 Jun 2022 18:07):    No growth    Culture - Surgical Swab (collected 02 Jun 2022 22:18)  Source: .Surgical Swab Deep Wound Culture 2/2  Preliminary Report (04 Jun 2022 07:57):    Few Staphylococcus aureus    Culture - Surgical Swab (collected 02 Jun 2022 22:18)  Source: .Surgical Swab Deep Wound Culture 1/2  Preliminary Report (04 Jun 2022 09:48):    Few Staphylococcus aureus    Rare Escherichia coli               Review of Systems:  Review of Systems: REVIEW OF SYSTEMS:    CONSTITUTIONAL: No weakness, fevers or chills  EYES: No vertigo or throat pain  ENT: No visual changes, eye pain  MOUTH: moist luke mucosal, no mouth ulcers  NECK: No pain or stiffness  RESPIRATORY: No cough, wheezing, hemoptysis; No shortness of breath  CARDIOVASCULAR: No chest pain or palpitations  GASTROINTESTINAL: No abdominal or epigastric pain. No nausea, vomiting, or hematemesis; No diarrhea or constipation. No melena or hematochezia.  GENITOURINARY: No dysuria, frequency or hematuria  NEUROLOGICAL: No numbness or weakness  SKIN: No itching, + Right toe infection  PSYCH: No anxiety or depression    Vital Signs Last 24 Hrs  T(C): 36.9 (05 Jun 2022 04:03), Max: 36.9 (04 Jun 2022 18:39)  T(F): 98.5 (05 Jun 2022 04:03), Max: 98.5 (05 Jun 2022 04:03)  HR: 79 (05 Jun 2022 04:03) (79 - 92)  BP: 116/82 (05 Jun 2022 04:03) (112/78 - 116/82)  BP(mean): --  RR: 18 (05 Jun 2022 04:03) (16 - 18)  SpO2: 98% (05 Jun 2022 04:03) (98% - 98%)    Physical Exam  Vascular: non palpable pedal pulses to b/l feet. TG warm to touch right foot. CFT instant  Derm: Surgical side with small area of dehiscence noted    ortho: Decreased ROM to b/l ankles.   Neuro: protective sensation is grossly diminished.     
Patient is a 42y old  Male who presents with a chief complaint of foot OM (05 Jun 2022 11:09)      INTERVAL HPI/OVERNIGHT EVENTS:  services used, ref # [id number] seen and examined. NO complains. Awaiting to get PICC line and antibiotics set up at home     MEDICATIONS  (STANDING):  cyanocobalamin 1000 MICROGram(s) Oral daily  dextrose 5%. 1000 milliLiter(s) (50 mL/Hr) IV Continuous <Continuous>  dextrose 5%. 1000 milliLiter(s) (100 mL/Hr) IV Continuous <Continuous>  dextrose 5%. 1000 milliLiter(s) (100 mL/Hr) IV Continuous <Continuous>  dextrose 5%. 1000 milliLiter(s) (50 mL/Hr) IV Continuous <Continuous>  dextrose 50% Injectable 25 Gram(s) IV Push once  dextrose 50% Injectable 12.5 Gram(s) IV Push once  dextrose 50% Injectable 25 Gram(s) IV Push once  dextrose 50% Injectable 25 Gram(s) IV Push once  dextrose 50% Injectable 12.5 Gram(s) IV Push once  dextrose 50% Injectable 25 Gram(s) IV Push once  gabapentin 300 milliGRAM(s) Oral at bedtime  glucagon  Injectable 1 milliGRAM(s) IntraMuscular once  glucagon  Injectable 1 milliGRAM(s) IntraMuscular once  glucagon  Injectable 1 milliGRAM(s) IntraMuscular once  insulin glargine Injectable (LANTUS) 14 Unit(s) SubCutaneous at bedtime  insulin lispro (ADMELOG) corrective regimen sliding scale   SubCutaneous Before meals and at bedtime  insulin lispro Injectable (ADMELOG) 5 Unit(s) SubCutaneous three times a day before meals  meropenem  IVPB 2000 milliGRAM(s) IV Intermittent every 8 hours  vancomycin  IVPB 1000 milliGRAM(s) IV Intermittent every 12 hours    MEDICATIONS  (PRN):  acetaminophen     Tablet .. 650 milliGRAM(s) Oral every 6 hours PRN Temp greater or equal to 38C (100.4F), Mild Pain (1 - 3)  aluminum hydroxide/magnesium hydroxide/simethicone Suspension 30 milliLiter(s) Oral every 4 hours PRN Dyspepsia  dextrose Oral Gel 15 Gram(s) Oral once PRN Blood Glucose LESS THAN 70 milliGRAM(s)/deciliter  dextrose Oral Gel 15 Gram(s) Oral once PRN Blood Glucose LESS THAN 70 milliGRAM(s)/deciliter  melatonin 3 milliGRAM(s) Oral at bedtime PRN Insomnia  ondansetron Injectable 4 milliGRAM(s) IV Push every 8 hours PRN Nausea and/or Vomiting      Allergies    No Known Allergies    Intolerances        REVIEW OF SYSTEMS:  CONSTITUTIONAL: No fever, weight loss, or fatigue  RESPIRATORY: No cough, wheezing, chills or hemoptysis; No shortness of breath  CARDIOVASCULAR: No chest pain, palpitations, dizziness, or leg swelling  GASTROINTESTINAL: No abdominal or epigastric pain. No nausea, vomiting, or hematemesis; No diarrhea or constipation. No melena or hematochezia.  NEUROLOGICAL: No headaches, memory loss, loss of strength, numbness, or tremors  MUSCULOSKELETAL: No joint pain or swelling; No muscle, back, or extremity pain      Vital Signs Last 24 Hrs  T(C): 36.8 (06 Jun 2022 10:45), Max: 36.8 (06 Jun 2022 10:45)  T(F): 98.3 (06 Jun 2022 10:45), Max: 98.3 (06 Jun 2022 10:45)  HR: 88 (06 Jun 2022 10:45) (85 - 92)  BP: 103/71 (06 Jun 2022 10:45) (103/71 - 109/73)  BP(mean): --  RR: 18 (06 Jun 2022 10:45) (18 - 18)  SpO2: 98% (06 Jun 2022 10:45) (97% - 98%)    PHYSICAL EXAM:  GENERAL: NAD,   HEAD:  Atraumatic, Normocephalic  EYES: EOMI, PERRLA, conjunctiva and sclera clear  NECK: Supple, No JVD, Normal thyroid  NERVOUS SYSTEM:  Alert & Oriented X3, No gross focal deficits  CHEST/LUNG: Clear to percussion bilaterally; No rales, rhonchi, wheezing, or rubs  HEART: Regular rate and rhythm; No murmurs, rubs, or gallops  ABDOMEN: Soft, Nontender, Nondistended; Bowel sounds present  EXTREMITIES:  No clubbing, cyanosis, or edema  SKIN: No rashes or lesions    LABS:              CAPILLARY BLOOD GLUCOSE      POCT Blood Glucose.: 146 mg/dL (06 Jun 2022 11:33)  POCT Blood Glucose.: 166 mg/dL (06 Jun 2022 07:36)  POCT Blood Glucose.: 208 mg/dL (05 Jun 2022 21:12)  POCT Blood Glucose.: 194 mg/dL (05 Jun 2022 16:47)      RADIOLOGY & ADDITIONAL TESTS:    Imaging Personally Reviewed:  [ ] YES  [ ] NO    Consultant(s) Notes Reviewed:  [ ] YES  [ ] NO    Care Discussed with Consultants/Other Providers [ ] YES  [ ] NO    Plan of Care discussed with Housestaff [ ]YES [ ] NO
Rai Howard M.D.    Patient is a 42y old  Male who presents with a chief complaint of foot OM (01 Jun 2022 11:48)      SUBJECTIVE / OVERNIGHT EVENTS: no concerns.     Patient denies chest pain, SOB, abd pain, N/V, fever, chills, dysuria or any other complaints. All remainder ROS negative.     MEDICATIONS  (STANDING):  cyanocobalamin 1000 MICROGram(s) Oral daily  dextrose 5%. 1000 milliLiter(s) (100 mL/Hr) IV Continuous <Continuous>  dextrose 5%. 1000 milliLiter(s) (50 mL/Hr) IV Continuous <Continuous>  dextrose 50% Injectable 25 Gram(s) IV Push once  dextrose 50% Injectable 12.5 Gram(s) IV Push once  dextrose 50% Injectable 25 Gram(s) IV Push once  gabapentin 300 milliGRAM(s) Oral at bedtime  glucagon  Injectable 1 milliGRAM(s) IntraMuscular once  insulin glargine Injectable (LANTUS) 10 Unit(s) SubCutaneous at bedtime  insulin lispro (ADMELOG) corrective regimen sliding scale   SubCutaneous Before meals and at bedtime  piperacillin/tazobactam IVPB.. 3.375 Gram(s) IV Intermittent every 8 hours  vancomycin  IVPB 750 milliGRAM(s) IV Intermittent every 12 hours    MEDICATIONS  (PRN):  acetaminophen     Tablet .. 650 milliGRAM(s) Oral every 6 hours PRN Temp greater or equal to 38C (100.4F), Mild Pain (1 - 3)  aluminum hydroxide/magnesium hydroxide/simethicone Suspension 30 milliLiter(s) Oral every 4 hours PRN Dyspepsia  dextrose Oral Gel 15 Gram(s) Oral once PRN Blood Glucose LESS THAN 70 milliGRAM(s)/deciliter  melatonin 3 milliGRAM(s) Oral at bedtime PRN Insomnia  ondansetron Injectable 4 milliGRAM(s) IV Push every 8 hours PRN Nausea and/or Vomiting      I&O's Summary      PHYSICAL EXAM:  Vital Signs Last 24 Hrs  T(C): 36.7 (02 Jun 2022 04:29), Max: 36.7 (01 Jun 2022 12:06)  T(F): 98 (02 Jun 2022 04:29), Max: 98 (01 Jun 2022 12:06)  HR: 79 (02 Jun 2022 04:29) (76 - 85)  BP: 104/69 (02 Jun 2022 04:29) (104/69 - 119/85)  BP(mean): --  RR: 18 (02 Jun 2022 04:29) (18 - 20)  SpO2: 96% (02 Jun 2022 04:29) (96% - 98%)    CONSTITUTIONAL: NAD, well-groomed  ENMT: Moist oral mucosa, no pharyngeal injection or exudates; normal dentition  RESPIRATORY: Normal respiratory effort; lungs are clear to auscultation bilaterally  CARDIOVASCULAR: Regular rate and rhythm, normal S1 and S2, no murmur/rub/gallop; No lower extremity edema; Peripheral pulses are 2+ bilaterally  ABDOMEN: Nontender to palpation, normoactive bowel sounds, no rebound/guarding; No hepatosplenomegaly  MUSCLOSKELETAL:  Normal gait; no clubbing or cyanosis of digits; no joint swelling or tenderness to palpation  PSYCH: A+O x3; affect appropriate  NEUROLOGY: CN 2-12 are intact and symmetric; no gross sensory deficits;   SKIN:  Right toe in dressing    LABS:                        11.3   9.57  )-----------( 382      ( 02 Jun 2022 05:52 )             36.0     06-02    136  |  101  |  23.2<H>  ----------------------------<  115<H>  4.0   |  24.0  |  1.45<H>    Ca    9.0      02 Jun 2022 05:52  Phos  3.4     06-02  Mg     1.8     06-02      PT/INR - ( 01 Jun 2022 05:28 )   PT: 13.2 sec;   INR: 1.14 ratio         PTT - ( 01 Jun 2022 05:28 )  PTT:32.3 sec          CAPILLARY BLOOD GLUCOSE      POCT Blood Glucose.: 138 mg/dL (02 Jun 2022 07:35)  POCT Blood Glucose.: 141 mg/dL (01 Jun 2022 22:21)  POCT Blood Glucose.: 147 mg/dL (01 Jun 2022 16:29)  POCT Blood Glucose.: 152 mg/dL (01 Jun 2022 15:26)  POCT Blood Glucose.: 112 mg/dL (01 Jun 2022 11:26)      RADIOLOGY & ADDITIONAL TESTS:  Results Reviewed:   Imaging Personally Reviewed:  Electrocardiogram Personally Reviewed:
HPI:   41 y/o M with PMHx IDDM who presents to the ED c/o right 5th toe infection. Patient noted infection about 1.5 months ago, reports that the toe was turning black wiht associated pain. Patient was seen by a physician in [facility] who gave him a cream as well as PO antibiotics (3rd generation cephalosporin and Levaquin), which he has been taking for about a month. The wound was not healing properly so he went to a clinic and was informed to come to the ED for further care.     PAST MEDICAL & SURGICAL HISTORY:  Diabetes  H/O hand surgery    Social History:  Denies smoking/other drugs  drinks heavily on the weekend. Last drink was > 1 month ago (29 May 2022 17:11)    FAMILY HISTORY:  No pertinent family history in first degree relatives    Allergies  No Known Allergies    MEDICATIONS  (STANDING):  dextrose 5%. 1000 milliLiter(s) (50 mL/Hr) IV Continuous <Continuous>  dextrose 5%. 1000 milliLiter(s) (100 mL/Hr) IV Continuous <Continuous>  dextrose 50% Injectable 25 Gram(s) IV Push once  dextrose 50% Injectable 12.5 Gram(s) IV Push once  dextrose 50% Injectable 25 Gram(s) IV Push once  gabapentin 300 milliGRAM(s) Oral at bedtime  glucagon  Injectable 1 milliGRAM(s) IntraMuscular once  insulin glargine Injectable (LANTUS) 10 Unit(s) SubCutaneous at bedtime  insulin lispro (ADMELOG) corrective regimen sliding scale   SubCutaneous Before meals and at bedtime  piperacillin/tazobactam IVPB.. 3.375 Gram(s) IV Intermittent every 8 hours  vancomycin  IVPB 1250 milliGRAM(s) IV Intermittent every 12 hours    MEDICATIONS  (PRN):  acetaminophen     Tablet .. 650 milliGRAM(s) Oral every 6 hours PRN Temp greater or equal to 38C (100.4F), Mild Pain (1 - 3)  aluminum hydroxide/magnesium hydroxide/simethicone Suspension 30 milliLiter(s) Oral every 4 hours PRN Dyspepsia  dextrose Oral Gel 15 Gram(s) Oral once PRN Blood Glucose LESS THAN 70 milliGRAM(s)/deciliter  melatonin 3 milliGRAM(s) Oral at bedtime PRN Insomnia  ondansetron Injectable 4 milliGRAM(s) IV Push every 8 hours PRN Nausea and/or Vomiting                          11.2   10.19 )-----------( 346      ( 30 May 2022 04:18 )             35.0   05-30    139  |  104  |  16.8  ----------------------------<  90  3.9   |  25.0  |  1.12    Ca    8.8      30 May 2022 04:18  Phos  4.0     05-30  Mg     1.7     05-30    TPro  9.5<H>  /  Alb  4.2  /  TBili  0.3<L>  /  DBili  x   /  AST  31  /  ALT  25  /  AlkPhos  97  05-29       Review of Systems:  Review of Systems: REVIEW OF SYSTEMS:    CONSTITUTIONAL: No weakness, fevers or chills  EYES: No vertigo or throat pain  ENT: No visual changes, eye pain  MOUTH: moist luke mucosal, no mouth ulcers  NECK: No pain or stiffness  RESPIRATORY: No cough, wheezing, hemoptysis; No shortness of breath  CARDIOVASCULAR: No chest pain or palpitations  GASTROINTESTINAL: No abdominal or epigastric pain. No nausea, vomiting, or hematemesis; No diarrhea or constipation. No melena or hematochezia.  GENITOURINARY: No dysuria, frequency or hematuria  NEUROLOGICAL: No numbness or weakness  SKIN: No itching, + Right toe infection  PSYCH: No anxiety or depression      Physical Exam  Vascular: non palpable pedal pulses to b/l feet. TG warm to touch right foot. CFT instant  Derm: There is full thickness ulceration noted to 4th IS and dorsal foot measured about [id number] with fibrotic base and macerated wound edges. There is bone and tendon exposed. 5th toe has necrotic changes with local erythema   ortho: Decreased ROM to b/l ankles. There is pain with palpation to 5th toe   Neuro: protective sensation is grossly diminished.     
HPI:   43 y/o M with PMHx IDDM who presents to the ED c/o right 5th toe infection s/p amputation pod#2 patient reports no pain to right foot.     PAST MEDICAL & SURGICAL HISTORY:  Diabetes  H/O hand surgery    Social History:  Denies smoking/other drugs  drinks heavily on the weekend. Last drink was > 1 month ago (29 May 2022 17:11)    FAMILY HISTORY:  No pertinent family history in first degree relatives    Allergies  No Known Allergies    MEDICATIONS  (STANDING):  dextrose 5%. 1000 milliLiter(s) (50 mL/Hr) IV Continuous <Continuous>  dextrose 5%. 1000 milliLiter(s) (100 mL/Hr) IV Continuous <Continuous>  dextrose 50% Injectable 25 Gram(s) IV Push once  dextrose 50% Injectable 12.5 Gram(s) IV Push once  dextrose 50% Injectable 25 Gram(s) IV Push once  gabapentin 300 milliGRAM(s) Oral at bedtime  glucagon  Injectable 1 milliGRAM(s) IntraMuscular once  insulin glargine Injectable (LANTUS) 10 Unit(s) SubCutaneous at bedtime  insulin lispro (ADMELOG) corrective regimen sliding scale   SubCutaneous Before meals and at bedtime  piperacillin/tazobactam IVPB.. 3.375 Gram(s) IV Intermittent every 8 hours  vancomycin  IVPB 1250 milliGRAM(s) IV Intermittent every 12 hours    MEDICATIONS  (PRN):  acetaminophen     Tablet .. 650 milliGRAM(s) Oral every 6 hours PRN Temp greater or equal to 38C (100.4F), Mild Pain (1 - 3)  aluminum hydroxide/magnesium hydroxide/simethicone Suspension 30 milliLiter(s) Oral every 4 hours PRN Dyspepsia  dextrose Oral Gel 15 Gram(s) Oral once PRN Blood Glucose LESS THAN 70 milliGRAM(s)/deciliter  melatonin 3 milliGRAM(s) Oral at bedtime PRN Insomnia  ondansetron Injectable 4 milliGRAM(s) IV Push every 8 hours PRN Nausea and/or Vomiting                          11.5   9.00  )-----------( 383      ( 04 Jun 2022 06:15 )             35.4   06-04    138  |  101  |  18.3  ----------------------------<  165<H>  4.0   |  25.0  |  1.28    Ca    9.6      04 Jun 2022 06:15  Phos  3.8     06-04  Mg     1.5     06-04    TPro  8.7  /  Alb  3.8  /  TBili  0.2<L>  /  DBili  x   /  AST  26  /  ALT  27  /  AlkPhos  80  06-04      Culture - Tissue with Gram Stain (collected 02 Jun 2022 22:19)  Source: .Tissue Bone Right Foot Culture  Gram Stain (03 Jun 2022 05:19):    No polymorphonuclear leukocytes seen per low power field    No organisms seen per oil power field  Preliminary Report (03 Jun 2022 18:07):    No growth    Culture - Surgical Swab (collected 02 Jun 2022 22:18)  Source: .Surgical Swab Deep Wound Culture 2/2  Preliminary Report (04 Jun 2022 07:57):    Few Staphylococcus aureus    Culture - Surgical Swab (collected 02 Jun 2022 22:18)  Source: .Surgical Swab Deep Wound Culture 1/2  Preliminary Report (04 Jun 2022 09:48):    Few Staphylococcus aureus    Rare Escherichia coli               Review of Systems:  Review of Systems: REVIEW OF SYSTEMS:    CONSTITUTIONAL: No weakness, fevers or chills  EYES: No vertigo or throat pain  ENT: No visual changes, eye pain  MOUTH: moist luke mucosal, no mouth ulcers  NECK: No pain or stiffness  RESPIRATORY: No cough, wheezing, hemoptysis; No shortness of breath  CARDIOVASCULAR: No chest pain or palpitations  GASTROINTESTINAL: No abdominal or epigastric pain. No nausea, vomiting, or hematemesis; No diarrhea or constipation. No melena or hematochezia.  GENITOURINARY: No dysuria, frequency or hematuria  NEUROLOGICAL: No numbness or weakness  SKIN: No itching, + Right toe infection  PSYCH: No anxiety or depression    Vital Signs Last 24 Hrs  T(C): 36.6 (03 Jun 2022 04:17), Max: 36.6 (02 Jun 2022 16:59)  T(F): 97.9 (03 Jun 2022 04:17), Max: 97.9 (02 Jun 2022 16:59)  HR: 103 (03 Jun 2022 04:17) (69 - 103)  BP: 102/67 (03 Jun 2022 04:17) (95/61 - 137/89)  BP(mean): --  RR: 18 (03 Jun 2022 04:17) (11 - 18)  SpO2: 98% (03 Jun 2022 04:17) (95% - 98%)    Physical Exam  Vascular: non palpable pedal pulses to b/l feet. TG warm to touch right foot. CFT instant  Derm: Surgical side well coapted. All sutures are intact.   ortho: Decreased ROM to b/l ankles.   Neuro: protective sensation is grossly diminished.     
Patient is a 42y old  Male who presents with a chief complaint of foot OM (07 Jun 2022 15:39)      INTERVAL HPI/OVERNIGHT EVENTS: seen and examined. No complains. No events. Awaiting antibiotics set up at home     MEDICATIONS  (STANDING):  chlorhexidine 2% Cloths 1 Application(s) Topical <User Schedule>  cyanocobalamin 1000 MICROGram(s) Oral daily  dextrose 5%. 1000 milliLiter(s) (100 mL/Hr) IV Continuous <Continuous>  dextrose 5%. 1000 milliLiter(s) (50 mL/Hr) IV Continuous <Continuous>  dextrose 5%. 1000 milliLiter(s) (50 mL/Hr) IV Continuous <Continuous>  dextrose 5%. 1000 milliLiter(s) (100 mL/Hr) IV Continuous <Continuous>  dextrose 50% Injectable 25 Gram(s) IV Push once  dextrose 50% Injectable 12.5 Gram(s) IV Push once  dextrose 50% Injectable 25 Gram(s) IV Push once  dextrose 50% Injectable 25 Gram(s) IV Push once  dextrose 50% Injectable 12.5 Gram(s) IV Push once  dextrose 50% Injectable 25 Gram(s) IV Push once  gabapentin 300 milliGRAM(s) Oral at bedtime  glucagon  Injectable 1 milliGRAM(s) IntraMuscular once  glucagon  Injectable 1 milliGRAM(s) IntraMuscular once  glucagon  Injectable 1 milliGRAM(s) IntraMuscular once  insulin glargine Injectable (LANTUS) 14 Unit(s) SubCutaneous at bedtime  insulin lispro (ADMELOG) corrective regimen sliding scale   SubCutaneous Before meals and at bedtime  insulin lispro Injectable (ADMELOG) 5 Unit(s) SubCutaneous three times a day before meals  meropenem  IVPB 2000 milliGRAM(s) IV Intermittent every 8 hours  mupirocin 2% Ointment 1 Application(s) Both Nostrils two times a day  vancomycin  IVPB 750 milliGRAM(s) IV Intermittent every 12 hours    MEDICATIONS  (PRN):  acetaminophen     Tablet .. 650 milliGRAM(s) Oral every 6 hours PRN Temp greater or equal to 38C (100.4F), Mild Pain (1 - 3)  aluminum hydroxide/magnesium hydroxide/simethicone Suspension 30 milliLiter(s) Oral every 4 hours PRN Dyspepsia  dextrose Oral Gel 15 Gram(s) Oral once PRN Blood Glucose LESS THAN 70 milliGRAM(s)/deciliter  dextrose Oral Gel 15 Gram(s) Oral once PRN Blood Glucose LESS THAN 70 milliGRAM(s)/deciliter  melatonin 3 milliGRAM(s) Oral at bedtime PRN Insomnia  ondansetron Injectable 4 milliGRAM(s) IV Push every 8 hours PRN Nausea and/or Vomiting  sodium chloride 0.9% lock flush 10 milliLiter(s) IV Push every 1 hour PRN Pre/post blood products, medications, blood draw, and to maintain line patency      Allergies    No Known Allergies    Intolerances        REVIEW OF SYSTEMS:  CONSTITUTIONAL: No fever, weight loss, or fatigue  RESPIRATORY: No cough, wheezing, chills or hemoptysis; No shortness of breath  CARDIOVASCULAR: No chest pain, palpitations, dizziness, or leg swelling  GASTROINTESTINAL: No abdominal or epigastric pain. No nausea, vomiting, or hematemesis; No diarrhea or constipation. No melena or hematochezia.  NEUROLOGICAL: No headaches, memory loss, loss of strength, numbness, or tremors  MUSCULOSKELETAL: No joint pain or swelling; No muscle, back, or extremity pain      Vital Signs Last 24 Hrs  T(C): 36.6 (08 Jun 2022 12:01), Max: 36.7 (07 Jun 2022 17:48)  T(F): 97.8 (08 Jun 2022 12:01), Max: 98.1 (07 Jun 2022 17:48)  HR: 85 (08 Jun 2022 12:01) (77 - 97)  BP: 106/68 (08 Jun 2022 12:01) (106/68 - 123/81)  BP(mean): --  RR: 18 (08 Jun 2022 12:01) (18 - 18)  SpO2: 98% (08 Jun 2022 12:01) (97% - 99%)    PHYSICAL EXAM:  GENERAL: NAD,   HEAD:  Atraumatic, Normocephalic  EYES: EOMI, PERRLA, conjunctiva and sclera clear  NECK: Supple, No JVD, Normal thyroid  NERVOUS SYSTEM:  Alert & Oriented X3, No gross focal deficits  CHEST/LUNG: Clear to percussion bilaterally; No rales, rhonchi, wheezing, or rubs  HEART: Regular rate and rhythm; No murmurs, rubs, or gallops  ABDOMEN: Soft, Nontender, Nondistended; Bowel sounds present  EXTREMITIES:  No clubbing, cyanosis, or edema  SKIN: No rashes or lesions    LABS:    06-08    x   |  x   |  x   ----------------------------<  x   x    |  x   |  1.29    Ca    9.6      07 Jun 2022 14:55          CAPILLARY BLOOD GLUCOSE      POCT Blood Glucose.: 100 mg/dL (08 Jun 2022 12:01)  POCT Blood Glucose.: 175 mg/dL (08 Jun 2022 08:01)  POCT Blood Glucose.: 224 mg/dL (07 Jun 2022 21:52)  POCT Blood Glucose.: 124 mg/dL (07 Jun 2022 16:19)      RADIOLOGY & ADDITIONAL TESTS:    Imaging Personally Reviewed:  [ ] YES  [ ] NO    Consultant(s) Notes Reviewed:  [ ] YES  [ ] NO    Care Discussed with Consultants/Other Providers [ ] YES  [ ] NO    Plan of Care discussed with Housestaff [ ]YES [ ] NO
Rai Howard M.D.    Patient is a 42y old  Male who presents with a chief complaint of foot OM (01 Jun 2022 11:20)      SUBJECTIVE / OVERNIGHT EVENTS: no concerns.     Patient denies chest pain, SOB, abd pain, N/V, fever, chills, dysuria or any other complaints. All remainder ROS negative.     MEDICATIONS  (STANDING):  cyanocobalamin 1000 MICROGram(s) Oral daily  dextrose 5%. 1000 milliLiter(s) (100 mL/Hr) IV Continuous <Continuous>  dextrose 5%. 1000 milliLiter(s) (50 mL/Hr) IV Continuous <Continuous>  dextrose 50% Injectable 25 Gram(s) IV Push once  dextrose 50% Injectable 12.5 Gram(s) IV Push once  dextrose 50% Injectable 25 Gram(s) IV Push once  gabapentin 300 milliGRAM(s) Oral at bedtime  glucagon  Injectable 1 milliGRAM(s) IntraMuscular once  insulin glargine Injectable (LANTUS) 10 Unit(s) SubCutaneous at bedtime  insulin lispro (ADMELOG) corrective regimen sliding scale   SubCutaneous Before meals and at bedtime  piperacillin/tazobactam IVPB.. 3.375 Gram(s) IV Intermittent every 8 hours  vancomycin  IVPB 750 milliGRAM(s) IV Intermittent every 12 hours    MEDICATIONS  (PRN):  acetaminophen     Tablet .. 650 milliGRAM(s) Oral every 6 hours PRN Temp greater or equal to 38C (100.4F), Mild Pain (1 - 3)  aluminum hydroxide/magnesium hydroxide/simethicone Suspension 30 milliLiter(s) Oral every 4 hours PRN Dyspepsia  dextrose Oral Gel 15 Gram(s) Oral once PRN Blood Glucose LESS THAN 70 milliGRAM(s)/deciliter  melatonin 3 milliGRAM(s) Oral at bedtime PRN Insomnia  ondansetron Injectable 4 milliGRAM(s) IV Push every 8 hours PRN Nausea and/or Vomiting      I&O's Summary      PHYSICAL EXAM:  Vital Signs Last 24 Hrs  T(C): 36.6 (01 Jun 2022 04:46), Max: 37 (31 May 2022 17:23)  T(F): 97.8 (01 Jun 2022 04:46), Max: 98.6 (31 May 2022 17:23)  HR: 77 (01 Jun 2022 04:46) (77 - 80)  BP: 115/79 (01 Jun 2022 04:46) (109/74 - 115/79)  BP(mean): --  RR: 20 (01 Jun 2022 04:46) (19 - 20)  SpO2: 97% (01 Jun 2022 04:46) (95% - 97%)    CONSTITUTIONAL: NAD, well-groomed  ENMT: Moist oral mucosa, no pharyngeal injection or exudates; normal dentition  RESPIRATORY: Normal respiratory effort; lungs are clear to auscultation bilaterally  CARDIOVASCULAR: Regular rate and rhythm, normal S1 and S2, no murmur/rub/gallop; No lower extremity edema; Peripheral pulses are 2+ bilaterally  ABDOMEN: Nontender to palpation, normoactive bowel sounds, no rebound/guarding; No hepatosplenomegaly  MUSCLOSKELETAL:  Normal gait; no clubbing or cyanosis of digits; no joint swelling or tenderness to palpation  PSYCH: A+O x3; affect appropriate  NEUROLOGY: CN 2-12 are intact and symmetric; no gross sensory deficits;   SKIN:  Right toe in dressing    LABS:                        11.9   8.82  )-----------( 375      ( 01 Jun 2022 05:28 )             37.7     06-01    136  |  103  |  21.2<H>  ----------------------------<  123<H>  4.2   |  23.0  |  1.39<H>    Ca    9.0      01 Jun 2022 05:28  Phos  3.5     06-01  Mg     1.8     06-01      PT/INR - ( 01 Jun 2022 05:28 )   PT: 13.2 sec;   INR: 1.14 ratio         PTT - ( 01 Jun 2022 05:28 )  PTT:32.3 sec          Culture - Blood (collected 29 May 2022 11:56)  Source: .Blood Blood-Peripheral  Preliminary Report (31 May 2022 13:00):    No growth at 48 hours    Culture - Blood (collected 29 May 2022 11:55)  Source: .Blood Blood-Peripheral  Preliminary Report (31 May 2022 13:00):    No growth at 48 hours      CAPILLARY BLOOD GLUCOSE      POCT Blood Glucose.: 112 mg/dL (01 Jun 2022 11:26)  POCT Blood Glucose.: 130 mg/dL (01 Jun 2022 08:01)  POCT Blood Glucose.: 154 mg/dL (31 May 2022 21:33)  POCT Blood Glucose.: 142 mg/dL (31 May 2022 15:49)      RADIOLOGY & ADDITIONAL TESTS:  Results Reviewed:   Imaging Personally Reviewed:  Electrocardiogram Personally Reviewed:
                                           [facility]                                                INFECTIOUS DISEASES  =======================================================                               Guy Felipe MD#  Colton Estevez MD*                                     Lynnette Quan MD*    Zabrina Elena MD*            Diplomates American Board of Internal Medicine & Infectious Diseases                  # [place] Office - Appt - Tel  [phone] Fax [phone]                * [place] Office - Appt - Tel [phone] Fax [phone]                                  Hospital Consult line:  [phone]  =======================================================    [facility]-[id number]  JERZY REKHA   follow up:  foot infection, osteomyelitis    no new issues       I have personally reviewed the labs and data; pertinent labs and data are listed in this note; please see below.   =======================================================  Past Medical & Surgical Hx:  =====================  PAST MEDICAL & SURGICAL HISTORY:  Diabetes  H/O hand surgery    Problem List:  ==========  HEALTH ISSUES - PROBLEM Dx:    Social Hx:  =======  no toxic habits currently    FAMILY HISTORY:  No pertinent family history in first degree relatives    no significant family history of immunosuppressive disorders in mother or father   =======================================================    REVIEW OF SYSTEMS:  CONSTITUTIONAL:  No Fever or chills  HEENT:  No diplopia or blurred vision.  No earache, sore throat or runny nose.  CARDIOVASCULAR:  No pressure, squeezing, strangling, tightness, heaviness or aching about the chest, neck, axilla or epigastrium.  RESPIRATORY:  No cough, shortness of breath  GASTROINTESTINAL:  No nausea, vomiting or diarrhea.  GENITOURINARY:  No dysuria, frequency or urgency. No Blood in urine  MUSCULOSKELETAL:   as per HPI  SKIN:   as per HPI  NEUROLOGIC:  No Headaches, seizures or weakness.  PSYCHIATRIC:  No disorder of thought or mood.  ENDOCRINE:  No heat or cold intolerance  HEMATOLOGICAL:  No easy bruising or bleeding.    =======================================================  Allergies  No Known Allergies     ======================================================  Physical Exam:  ============     General:  No acute distress.  Eye: Pupils are equal, round and reactive to light, Extraocular movements are intact, Normal conjunctiva.  HENT: Normocephalic, Oral mucosa is moist, No pharyngeal erythema, No sinus tenderness.  Neck: Supple, No lymphadenopathy.  Respiratory: Lungs are clear to auscultation, Respirations are non-labored.  Cardiovascular: Normal rate, Regular rhythm,   Gastrointestinal: Soft, Non-tender, Non-distended, Normal bowel sounds.  Genitourinary: No costovertebral angle tenderness.  Lymphatics: No lymphadenopathy neck,   Musculoskeletal: Normal range of motion, Normal strength.  Integumentary:  RIGHT  foot with Dressing in place  Neurologic: Alert, Oriented, No focal deficits, Cranial Nerves II-XII are grossly intact.  Psychiatric: Appropriate mood & affect.    =======================================================   Vitals:  ============  T(F): 98.8 (04 Jun 2022 10:20), Max: 98.8 (04 Jun 2022 10:20)  HR: 88 (04 Jun 2022 10:20)  BP: 103/72 (04 Jun 2022 10:20)  RR: 16 (04 Jun 2022 10:20)  SpO2: 99% (04 Jun 2022 10:20) (96% - 99%)  temp max in last 48H T(F): , Max: 98.8 (06-04-22 @ 10:20)    =======================================================  Current Antibiotics:  piperacillin/tazobactam IVPB.. 3.375 Gram(s) IV Intermittent every 8 hours    Other medications:  cyanocobalamin 1000 MICROGram(s) Oral daily  dextrose 5%. 1000 milliLiter(s) IV Continuous <Continuous>  dextrose 5%. 1000 milliLiter(s) IV Continuous <Continuous>  dextrose 5%. 1000 milliLiter(s) IV Continuous <Continuous>  dextrose 5%. 1000 milliLiter(s) IV Continuous <Continuous>  dextrose 50% Injectable 25 Gram(s) IV Push once  dextrose 50% Injectable 12.5 Gram(s) IV Push once  dextrose 50% Injectable 25 Gram(s) IV Push once  dextrose 50% Injectable 25 Gram(s) IV Push once  dextrose 50% Injectable 12.5 Gram(s) IV Push once  dextrose 50% Injectable 25 Gram(s) IV Push once  gabapentin 300 milliGRAM(s) Oral at bedtime  glucagon  Injectable 1 milliGRAM(s) IntraMuscular once  glucagon  Injectable 1 milliGRAM(s) IntraMuscular once  glucagon  Injectable 1 milliGRAM(s) IntraMuscular once  insulin glargine Injectable (LANTUS) 14 Unit(s) SubCutaneous at bedtime  insulin lispro (ADMELOG) corrective regimen sliding scale   SubCutaneous Before meals and at bedtime  insulin lispro Injectable (ADMELOG) 5 Unit(s) SubCutaneous three times a day before meals      =======================================================  Labs:                        11.5   9.00  )-----------( 383      ( 04 Jun 2022 06:15 )             35.4     06-04    138  |  101  |  18.3  ----------------------------<  165<H>  4.0   |  25.0  |  1.28    Ca    9.6      04 Jun 2022 06:15  Phos  3.8     06-04  Mg     1.5     06-04    TPro  8.7  /  Alb  3.8  /  TBili  0.2<L>  /  DBili  x   /  AST  26  /  ALT  28  /  AlkPhos  80  06-04    Culture - Tissue with Gram Stain (06.02.22 @ 22:19)    Gram Stain:   No polymorphonuclear leukocytes seen per low power field  No organisms seen per oil power field    Specimen Source: .Tissue Bone Right Foot Culture    Culture Results:   No growth      Culture - Surgical Swab (collected 06-02-22 @ 22:18)  Source: .Surgical Swab Deep Wound Culture 2/2    Culture - Surgical Swab (collected 06-02-22 @ 22:18)  Source: .Surgical Swab Deep Wound Culture 1/2    Culture - Blood (collected 05-29-22 @ 11:56)  Source: .Blood Blood-Peripheral  Final Report (06-03-22 @ 13:00):    No growth at 5 days.    Culture - Blood (collected 05-29-22 @ 11:55)  Source: .Blood Blood-Peripheral  Final Report (06-03-22 @ 13:00):    No growth at 5 days.        C-Reactive Protein, Serum: 17 mg/L (05-29-22 @ 11:52)    Sedimentation Rate, Erythrocyte: 31 mm/hr (05-29-22 @ 11:52)      COVID-19 PCR: NotDetec (05-31-22 @ 13:21)  SARS-CoV-2 Result: NotDetec (05-29-22 @ 11:54)      =======================================================      < from: MR Foot No Cont, Right (05.30.22 @ 19:51) >    ACC: [id number] EXAM:  MR FOOT RT                          PROCEDURE DATE:  05/30/2022          INTERPRETATION:  Clinical Information: Diabetes with foot ulcer    Comparison: Right foot radiographs from 5/29/2022.    Technique:  MRI of the right midfoot and forefoot.  Intravenous Contrast: None.    Findings:    There is a dorsal ulceration extending down to the bone over the proximal aspect of the fifth digit. There is hypointense T1 and hyperintense T2 marrow signal throughout the fifth metatarsal and the fifth digit. There is also abnormal marrow signal throughout the fourth metatarsal and the fourth proximal middle phalanges. These findings are consistent with osteomyelitis. There is minimal hyperintense T2 marrow signal within the head and neck of the third metatarsal and at the proximal aspect of the third proximal phalanx which could also be related to osteomyelitis. Evaluation for soft tissue infection is limited without intravenous contrast. There is hyperintenseT2 signal within the surrounding soft tissues and the muscles with relative sparing plantar medially which is consistent with myositis and cellulitis. The fifth flexor tendon is torn at the level of the fifth metatarsal phalangeal joint    A 1.4 x 0.6 cm region of hypointense T2 signal at the medial aspect of the plantar aponeurosis at the level of the proximal first metatarsal is consistent with a plantar fibroma.    Impression:  Osteomyelitis throughout the fifth ray and the fourth metatarsal and fourth proximal and middle phalanges. Early osteomyelitis 21 also suspected at the head and neck of the third metatarsal and the base of the third proximal phalanx.    --- End of Report ---        DREAD VERA MD; Attending Radiologist  This document has been electronically signed. May 31 2022  8:57AM    < end of copied text >  
HPI:   41 y/o M with PMHx IDDM who presents to the ED c/o right 5th toe infection s/p amputation pod#1 patient reports no pain to right foot.     PAST MEDICAL & SURGICAL HISTORY:  Diabetes  H/O hand surgery    Social History:  Denies smoking/other drugs  drinks heavily on the weekend. Last drink was > 1 month ago (29 May 2022 17:11)    FAMILY HISTORY:  No pertinent family history in first degree relatives    Allergies  No Known Allergies    MEDICATIONS  (STANDING):  dextrose 5%. 1000 milliLiter(s) (50 mL/Hr) IV Continuous <Continuous>  dextrose 5%. 1000 milliLiter(s) (100 mL/Hr) IV Continuous <Continuous>  dextrose 50% Injectable 25 Gram(s) IV Push once  dextrose 50% Injectable 12.5 Gram(s) IV Push once  dextrose 50% Injectable 25 Gram(s) IV Push once  gabapentin 300 milliGRAM(s) Oral at bedtime  glucagon  Injectable 1 milliGRAM(s) IntraMuscular once  insulin glargine Injectable (LANTUS) 10 Unit(s) SubCutaneous at bedtime  insulin lispro (ADMELOG) corrective regimen sliding scale   SubCutaneous Before meals and at bedtime  piperacillin/tazobactam IVPB.. 3.375 Gram(s) IV Intermittent every 8 hours  vancomycin  IVPB 1250 milliGRAM(s) IV Intermittent every 12 hours    MEDICATIONS  (PRN):  acetaminophen     Tablet .. 650 milliGRAM(s) Oral every 6 hours PRN Temp greater or equal to 38C (100.4F), Mild Pain (1 - 3)  aluminum hydroxide/magnesium hydroxide/simethicone Suspension 30 milliLiter(s) Oral every 4 hours PRN Dyspepsia  dextrose Oral Gel 15 Gram(s) Oral once PRN Blood Glucose LESS THAN 70 milliGRAM(s)/deciliter  melatonin 3 milliGRAM(s) Oral at bedtime PRN Insomnia  ondansetron Injectable 4 milliGRAM(s) IV Push every 8 hours PRN Nausea and/or Vomiting                          11.1   11.42 )-----------( 414      ( 03 Jun 2022 06:00 )             33.6   06-03    134<L>  |  101  |  21.7<H>  ----------------------------<  207<H>  3.9   |  22.0  |  1.26    Ca    9.1      03 Jun 2022 06:00  Phos  3.2     06-03  Mg     1.7     06-03    TPro  8.4  /  Alb  3.6  /  TBili  0.4  /  DBili  x   /  AST  19  /  ALT  19  /  AlkPhos  67  06-03      Culture - Tissue with Gram Stain (collected 02 Jun 2022 22:19)  Source: .Tissue Bone Right Foot Culture  Gram Stain (03 Jun 2022 05:19):    No polymorphonuclear leukocytes seen per low power field    No organisms seen per oil power field           Review of Systems:  Review of Systems: REVIEW OF SYSTEMS:    CONSTITUTIONAL: No weakness, fevers or chills  EYES: No vertigo or throat pain  ENT: No visual changes, eye pain  MOUTH: moist luke mucosal, no mouth ulcers  NECK: No pain or stiffness  RESPIRATORY: No cough, wheezing, hemoptysis; No shortness of breath  CARDIOVASCULAR: No chest pain or palpitations  GASTROINTESTINAL: No abdominal or epigastric pain. No nausea, vomiting, or hematemesis; No diarrhea or constipation. No melena or hematochezia.  GENITOURINARY: No dysuria, frequency or hematuria  NEUROLOGICAL: No numbness or weakness  SKIN: No itching, + Right toe infection  PSYCH: No anxiety or depression    Vital Signs Last 24 Hrs  T(C): 36.6 (03 Jun 2022 04:17), Max: 36.6 (02 Jun 2022 16:59)  T(F): 97.9 (03 Jun 2022 04:17), Max: 97.9 (02 Jun 2022 16:59)  HR: 103 (03 Jun 2022 04:17) (69 - 103)  BP: 102/67 (03 Jun 2022 04:17) (95/61 - 137/89)  BP(mean): --  RR: 18 (03 Jun 2022 04:17) (11 - 18)  SpO2: 98% (03 Jun 2022 04:17) (95% - 98%)    Physical Exam  Vascular: non palpable pedal pulses to b/l feet. TG warm to touch right foot. CFT instant  Derm: dressing is clean dry and intact to right foot.   ortho: Decreased ROM to b/l ankles.   Neuro: protective sensation is grossly diminished.     
HPI:   41 y/o M with PMHx IDDM who presents to the ED c/o right 5th toe infection s/p amputation. Patient reports no pain. Dressing to right foot clean dry and intact.     PAST MEDICAL & SURGICAL HISTORY:  Diabetes  H/O hand surgery    Social History:  Denies smoking/other drugs  drinks heavily on the weekend. Last drink was > 1 month ago (29 May 2022 17:11)    FAMILY HISTORY:  No pertinent family history in first degree relatives    Allergies  No Known Allergies    MEDICATIONS  (STANDING):  dextrose 5%. 1000 milliLiter(s) (50 mL/Hr) IV Continuous <Continuous>  dextrose 5%. 1000 milliLiter(s) (100 mL/Hr) IV Continuous <Continuous>  dextrose 50% Injectable 25 Gram(s) IV Push once  dextrose 50% Injectable 12.5 Gram(s) IV Push once  dextrose 50% Injectable 25 Gram(s) IV Push once  gabapentin 300 milliGRAM(s) Oral at bedtime  glucagon  Injectable 1 milliGRAM(s) IntraMuscular once  insulin glargine Injectable (LANTUS) 10 Unit(s) SubCutaneous at bedtime  insulin lispro (ADMELOG) corrective regimen sliding scale   SubCutaneous Before meals and at bedtime  piperacillin/tazobactam IVPB.. 3.375 Gram(s) IV Intermittent every 8 hours  vancomycin  IVPB 1250 milliGRAM(s) IV Intermittent every 12 hours    MEDICATIONS  (PRN):  acetaminophen     Tablet .. 650 milliGRAM(s) Oral every 6 hours PRN Temp greater or equal to 38C (100.4F), Mild Pain (1 - 3)  aluminum hydroxide/magnesium hydroxide/simethicone Suspension 30 milliLiter(s) Oral every 4 hours PRN Dyspepsia  dextrose Oral Gel 15 Gram(s) Oral once PRN Blood Glucose LESS THAN 70 milliGRAM(s)/deciliter  melatonin 3 milliGRAM(s) Oral at bedtime PRN Insomnia  ondansetron Injectable 4 milliGRAM(s) IV Push every 8 hours PRN Nausea and/or Vomiting                                   11.5   9.00  )-----------( 383      ( 04 Jun 2022 06:15 )             35.4   06-04    138  |  101  |  18.3  ----------------------------<  165<H>  4.0   |  25.0  |  1.28    Ca    9.6      04 Jun 2022 06:15  Phos  3.8     06-04  Mg     1.5     06-04    TPro  8.7  /  Alb  3.8  /  TBili  0.2<L>  /  DBili  x   /  AST  26  /  ALT  28  /  AlkPhos  80  06-04        Culture - Tissue with Gram Stain (collected 02 Jun 2022 22:19)  Source: .Tissue Bone Right Foot Culture  Gram Stain (03 Jun 2022 05:19):    No polymorphonuclear leukocytes seen per low power field    No organisms seen per oil power field  Preliminary Report (03 Jun 2022 18:07):    No growth    Culture - Surgical Swab (collected 02 Jun 2022 22:18)  Source: .Surgical Swab Deep Wound Culture 2/2  Preliminary Report (04 Jun 2022 07:57):    Few Staphylococcus aureus    Culture - Surgical Swab (collected 02 Jun 2022 22:18)  Source: .Surgical Swab Deep Wound Culture 1/2  Preliminary Report (04 Jun 2022 09:48):    Few Staphylococcus aureus    Rare Escherichia coli               Review of Systems:  Review of Systems: REVIEW OF SYSTEMS:    CONSTITUTIONAL: No weakness, fevers or chills  EYES: No vertigo or throat pain  ENT: No visual changes, eye pain  MOUTH: moist luke mucosal, no mouth ulcers  NECK: No pain or stiffness  RESPIRATORY: No cough, wheezing, hemoptysis; No shortness of breath  CARDIOVASCULAR: No chest pain or palpitations  GASTROINTESTINAL: No abdominal or epigastric pain. No nausea, vomiting, or hematemesis; No diarrhea or constipation. No melena or hematochezia.  GENITOURINARY: No dysuria, frequency or hematuria  NEUROLOGICAL: No numbness or weakness  SKIN: No itching, + Right toe infection  PSYCH: No anxiety or depression    Vital Signs Last 24 Hrs  T(C): 36.9 (05 Jun 2022 04:03), Max: 36.9 (04 Jun 2022 18:39)  T(F): 98.5 (05 Jun 2022 04:03), Max: 98.5 (05 Jun 2022 04:03)  HR: 79 (05 Jun 2022 04:03) (79 - 92)  BP: 116/82 (05 Jun 2022 04:03) (112/78 - 116/82)  BP(mean): --  RR: 18 (05 Jun 2022 04:03) (16 - 18)  SpO2: 98% (05 Jun 2022 04:03) (98% - 98%)    Physical Exam  Vascular: non palpable pedal pulses to b/l feet. TG warm to touch right foot. CFT instant  Derm: Surgical side well coapted. All sutures are intact.   ortho: Decreased ROM to b/l ankles.   Neuro: protective sensation is grossly diminished.     
HPI:   41 y/o M with PMHx IDDM who presents to the ED c/o right 5th toe infection s/p amputation. Patient reports no pain. Dressing to right foot clean dry and intact.     PAST MEDICAL & SURGICAL HISTORY:  Diabetes  H/O hand surgery    Social History:  Denies smoking/other drugs  drinks heavily on the weekend. Last drink was > 1 month ago (29 May 2022 17:11)    FAMILY HISTORY:  No pertinent family history in first degree relatives    Allergies  No Known Allergies    MEDICATIONS  (STANDING):  dextrose 5%. 1000 milliLiter(s) (50 mL/Hr) IV Continuous <Continuous>  dextrose 5%. 1000 milliLiter(s) (100 mL/Hr) IV Continuous <Continuous>  dextrose 50% Injectable 25 Gram(s) IV Push once  dextrose 50% Injectable 12.5 Gram(s) IV Push once  dextrose 50% Injectable 25 Gram(s) IV Push once  gabapentin 300 milliGRAM(s) Oral at bedtime  glucagon  Injectable 1 milliGRAM(s) IntraMuscular once  insulin glargine Injectable (LANTUS) 10 Unit(s) SubCutaneous at bedtime  insulin lispro (ADMELOG) corrective regimen sliding scale   SubCutaneous Before meals and at bedtime  piperacillin/tazobactam IVPB.. 3.375 Gram(s) IV Intermittent every 8 hours  vancomycin  IVPB 1250 milliGRAM(s) IV Intermittent every 12 hours    MEDICATIONS  (PRN):  acetaminophen     Tablet .. 650 milliGRAM(s) Oral every 6 hours PRN Temp greater or equal to 38C (100.4F), Mild Pain (1 - 3)  aluminum hydroxide/magnesium hydroxide/simethicone Suspension 30 milliLiter(s) Oral every 4 hours PRN Dyspepsia  dextrose Oral Gel 15 Gram(s) Oral once PRN Blood Glucose LESS THAN 70 milliGRAM(s)/deciliter  melatonin 3 milliGRAM(s) Oral at bedtime PRN Insomnia  ondansetron Injectable 4 milliGRAM(s) IV Push every 8 hours PRN Nausea and/or Vomiting                              Culture - Tissue with Gram Stain (collected 02 Jun 2022 22:19)  Source: .Tissue Bone Right Foot Culture  Gram Stain (03 Jun 2022 05:19):    No polymorphonuclear leukocytes seen per low power field    No organisms seen per oil power field  Preliminary Report (03 Jun 2022 18:07):    No growth    Culture - Surgical Swab (collected 02 Jun 2022 22:18)  Source: .Surgical Swab Deep Wound Culture 2/2  Preliminary Report (04 Jun 2022 07:57):    Few Staphylococcus aureus    Culture - Surgical Swab (collected 02 Jun 2022 22:18)  Source: .Surgical Swab Deep Wound Culture 1/2  Preliminary Report (04 Jun 2022 09:48):    Few Staphylococcus aureus    Rare Escherichia coli               Review of Systems:  Review of Systems: REVIEW OF SYSTEMS:    CONSTITUTIONAL: No weakness, fevers or chills  EYES: No vertigo or throat pain  ENT: No visual changes, eye pain  MOUTH: moist luke mucosal, no mouth ulcers  NECK: No pain or stiffness  RESPIRATORY: No cough, wheezing, hemoptysis; No shortness of breath  CARDIOVASCULAR: No chest pain or palpitations  GASTROINTESTINAL: No abdominal or epigastric pain. No nausea, vomiting, or hematemesis; No diarrhea or constipation. No melena or hematochezia.  GENITOURINARY: No dysuria, frequency or hematuria  NEUROLOGICAL: No numbness or weakness  SKIN: No itching, + Right toe infection  PSYCH: No anxiety or depression    Vital Signs Last 24 Hrs  T(C): 36.9 (05 Jun 2022 04:03), Max: 36.9 (04 Jun 2022 18:39)  T(F): 98.5 (05 Jun 2022 04:03), Max: 98.5 (05 Jun 2022 04:03)  HR: 79 (05 Jun 2022 04:03) (79 - 92)  BP: 116/82 (05 Jun 2022 04:03) (112/78 - 116/82)  BP(mean): --  RR: 18 (05 Jun 2022 04:03) (16 - 18)  SpO2: 98% (05 Jun 2022 04:03) (98% - 98%)    Physical Exam  Vascular: non palpable pedal pulses to b/l feet. TG warm to touch right foot. CFT instant  Derm: Surgical side with small area of dehiscence noted    ortho: Decreased ROM to b/l ankles.   Neuro: protective sensation is grossly diminished.     
HPI:   41 y/o M with PMHx IDDM who presents to the ED c/o right 5th toe infection. Patient noted infection about 1.5 months ago, reports that the toe was turning black wiht associated pain. Patient was seen by a physician in [facility] who gave him a cream as well as PO antibiotics (3rd generation cephalosporin and Levaquin), which he has been taking for about a month. The wound was not healing properly so he went to a clinic and was informed to come to the ED for further care.     PAST MEDICAL & SURGICAL HISTORY:  Diabetes  H/O hand surgery    Social History:  Denies smoking/other drugs  drinks heavily on the weekend. Last drink was > 1 month ago (29 May 2022 17:11)    FAMILY HISTORY:  No pertinent family history in first degree relatives    Allergies  No Known Allergies    MEDICATIONS  (STANDING):  dextrose 5%. 1000 milliLiter(s) (50 mL/Hr) IV Continuous <Continuous>  dextrose 5%. 1000 milliLiter(s) (100 mL/Hr) IV Continuous <Continuous>  dextrose 50% Injectable 25 Gram(s) IV Push once  dextrose 50% Injectable 12.5 Gram(s) IV Push once  dextrose 50% Injectable 25 Gram(s) IV Push once  gabapentin 300 milliGRAM(s) Oral at bedtime  glucagon  Injectable 1 milliGRAM(s) IntraMuscular once  insulin glargine Injectable (LANTUS) 10 Unit(s) SubCutaneous at bedtime  insulin lispro (ADMELOG) corrective regimen sliding scale   SubCutaneous Before meals and at bedtime  piperacillin/tazobactam IVPB.. 3.375 Gram(s) IV Intermittent every 8 hours  vancomycin  IVPB 1250 milliGRAM(s) IV Intermittent every 12 hours    MEDICATIONS  (PRN):  acetaminophen     Tablet .. 650 milliGRAM(s) Oral every 6 hours PRN Temp greater or equal to 38C (100.4F), Mild Pain (1 - 3)  aluminum hydroxide/magnesium hydroxide/simethicone Suspension 30 milliLiter(s) Oral every 4 hours PRN Dyspepsia  dextrose Oral Gel 15 Gram(s) Oral once PRN Blood Glucose LESS THAN 70 milliGRAM(s)/deciliter  melatonin 3 milliGRAM(s) Oral at bedtime PRN Insomnia  ondansetron Injectable 4 milliGRAM(s) IV Push every 8 hours PRN Nausea and/or Vomiting                          11.0   8.57  )-----------( 346      ( 31 May 2022 05:29 )             34.6   05-31    136  |  101  |  19.3  ----------------------------<  107<H>  3.6   |  25.0  |  1.39<H>    Ca    8.8      31 May 2022 05:29  Phos  4.0     05-30  Mg     1.7     05-30    TPro  9.5<H>  /  Alb  4.2  /  TBili  0.3<L>  /  DBili  x   /  AST  31  /  ALT  25  /  AlkPhos  97  05-29         Review of Systems:  Review of Systems: REVIEW OF SYSTEMS:    CONSTITUTIONAL: No weakness, fevers or chills  EYES: No vertigo or throat pain  ENT: No visual changes, eye pain  MOUTH: moist luke mucosal, no mouth ulcers  NECK: No pain or stiffness  RESPIRATORY: No cough, wheezing, hemoptysis; No shortness of breath  CARDIOVASCULAR: No chest pain or palpitations  GASTROINTESTINAL: No abdominal or epigastric pain. No nausea, vomiting, or hematemesis; No diarrhea or constipation. No melena or hematochezia.  GENITOURINARY: No dysuria, frequency or hematuria  NEUROLOGICAL: No numbness or weakness  SKIN: No itching, + Right toe infection  PSYCH: No anxiety or depression    Vital Signs Last 24 Hrs  T(C): 36.8 (31 May 2022 04:17), Max: 37.4 (30 May 2022 16:13)  T(F): 98.2 (31 May 2022 04:17), Max: 99.4 (30 May 2022 16:13)  HR: 72 (31 May 2022 04:17) (72 - 94)  BP: 102/66 (31 May 2022 04:17) (102/66 - 145/68)  BP(mean): --  RR: 18 (31 May 2022 04:17) (18 - 18)  SpO2: 97% (31 May 2022 04:17) (97% - 99%)    Physical Exam  Vascular: non palpable pedal pulses to b/l feet. TG warm to touch right foot. CFT instant  Derm: There is full thickness ulceration noted to 4th IS and dorsal foot measured about [id number] with fibrotic base and macerated wound edges. There is bone and tendon exposed. 5th toe has necrotic changes with local erythema   ortho: Decreased ROM to b/l ankles. There is pain with palpation to 5th toe   Neuro: protective sensation is grossly diminished.     
HPI:   41 y/o M with PMHx IDDM who presents to the ED c/o right 5th toe infection. Patient noted infection about 1.5 months ago, reports that the toe was turning black wiht associated pain. Patient was seen by a physician in [facility] who gave him a cream as well as PO antibiotics (3rd generation cephalosporin and Levaquin), which he has been taking for about a month. The wound was not healing properly so he went to a clinic and was informed to come to the ED for further care.     podiatry progress note: Patient seen at bedside. He admitted mild pain on his right foot. He denies any acute overnight event. He is aware of surgery on Thursday 6/2/22 . He denies F/C/N/V/SOB.    Medications acetaminophen     Tablet .. 650 milliGRAM(s) Oral every 6 hours PRN  aluminum hydroxide/magnesium hydroxide/simethicone Suspension 30 milliLiter(s) Oral every 4 hours PRN  cyanocobalamin 1000 MICROGram(s) Oral daily  dextrose 5%. 1000 milliLiter(s) IV Continuous <Continuous>  dextrose 5%. 1000 milliLiter(s) IV Continuous <Continuous>  dextrose 50% Injectable 25 Gram(s) IV Push once  dextrose 50% Injectable 12.5 Gram(s) IV Push once  dextrose 50% Injectable 25 Gram(s) IV Push once  dextrose Oral Gel 15 Gram(s) Oral once PRN  gabapentin 300 milliGRAM(s) Oral at bedtime  glucagon  Injectable 1 milliGRAM(s) IntraMuscular once  insulin glargine Injectable (LANTUS) 10 Unit(s) SubCutaneous at bedtime  insulin lispro (ADMELOG) corrective regimen sliding scale   SubCutaneous Before meals and at bedtime  melatonin 3 milliGRAM(s) Oral at bedtime PRN  ondansetron Injectable 4 milliGRAM(s) IV Push every 8 hours PRN  piperacillin/tazobactam IVPB.. 3.375 Gram(s) IV Intermittent every 8 hours  vancomycin  IVPB 750 milliGRAM(s) IV Intermittent every 12 hours    FHNo pertinent family history in first degree relatives    ,   PMHDiabetes       PSHNo significant past surgical history    H/O hand surgery        Labs                          11.9   8.82  )-----------( 375      ( 01 Jun 2022 05:28 )             37.7      06-01    136  |  103  |  21.2<H>  ----------------------------<  123<H>  4.2   |  23.0  |  1.39<H>    Ca    9.0      01 Jun 2022 05:28  Phos  3.5     06-01  Mg     1.8     06-01       Vital Signs Last 24 Hrs  T(C): 36.6 (01 Jun 2022 04:46), Max: 37 (31 May 2022 17:23)  T(F): 97.8 (01 Jun 2022 04:46), Max: 98.6 (31 May 2022 17:23)  HR: 77 (01 Jun 2022 04:46) (77 - 85)  BP: 115/79 (01 Jun 2022 04:46) (109/74 - 117/74)  BP(mean): --  RR: 20 (01 Jun 2022 04:46) (19 - 20)  SpO2: 97% (01 Jun 2022 04:46) (95% - 97%)  Sedimentation Rate, Erythrocyte: 31 mm/hr (05-29-22 @ 11:52)         C-Reactive Protein, Serum: 17 mg/L (05-29-22 @ 11:52)   WBC Count: 8.82 K/uL (06-01-22 @ 05:28)      Physical Exam  Vascular: non palpable pedal pulses to b/l feet. TG warm to touch right foot. CFT instant  Derm: There is full thickness ulceration noted to 4th IS and dorsal foot measured about [id number] with fibrotic base and macerated wound edges. There is bone and tendon exposed. 5th toe has necrotic changes with local erythema   ortho: Decreased ROM to b/l ankles. There is pain with palpation to 5th toe   Neuro: protective sensation is grossly diminished.     
INTERVAL HPI/OVERNIGHT EVENTS:  follow up T2DM     doing better s/p right 5th toe amputaitoion   appetitite is good   MEDICATIONS  (STANDING):  cyanocobalamin 1000 MICROGram(s) Oral daily  dextrose 5%. 1000 milliLiter(s) (50 mL/Hr) IV Continuous <Continuous>  dextrose 5%. 1000 milliLiter(s) (100 mL/Hr) IV Continuous <Continuous>  dextrose 5%. 1000 milliLiter(s) (50 mL/Hr) IV Continuous <Continuous>  dextrose 5%. 1000 milliLiter(s) (100 mL/Hr) IV Continuous <Continuous>  dextrose 50% Injectable 25 Gram(s) IV Push once  dextrose 50% Injectable 12.5 Gram(s) IV Push once  dextrose 50% Injectable 25 Gram(s) IV Push once  dextrose 50% Injectable 25 Gram(s) IV Push once  dextrose 50% Injectable 12.5 Gram(s) IV Push once  dextrose 50% Injectable 25 Gram(s) IV Push once  gabapentin 300 milliGRAM(s) Oral at bedtime  glucagon  Injectable 1 milliGRAM(s) IntraMuscular once  glucagon  Injectable 1 milliGRAM(s) IntraMuscular once  glucagon  Injectable 1 milliGRAM(s) IntraMuscular once  insulin glargine Injectable (LANTUS) 14 Unit(s) SubCutaneous at bedtime  insulin lispro (ADMELOG) corrective regimen sliding scale   SubCutaneous Before meals and at bedtime  insulin lispro Injectable (ADMELOG) 5 Unit(s) SubCutaneous three times a day before meals  piperacillin/tazobactam IVPB.. 3.375 Gram(s) IV Intermittent every 8 hours    MEDICATIONS  (PRN):  acetaminophen     Tablet .. 650 milliGRAM(s) Oral every 6 hours PRN Temp greater or equal to 38C (100.4F), Mild Pain (1 - 3)  aluminum hydroxide/magnesium hydroxide/simethicone Suspension 30 milliLiter(s) Oral every 4 hours PRN Dyspepsia  dextrose Oral Gel 15 Gram(s) Oral once PRN Blood Glucose LESS THAN 70 milliGRAM(s)/deciliter  dextrose Oral Gel 15 Gram(s) Oral once PRN Blood Glucose LESS THAN 70 milliGRAM(s)/deciliter  melatonin 3 milliGRAM(s) Oral at bedtime PRN Insomnia  ondansetron Injectable 4 milliGRAM(s) IV Push every 8 hours PRN Nausea and/or Vomiting      Allergies    No Known Allergies    Intolerances        Review of systems:  No CP no pressure   no dyspena   Vital Signs Last 24 Hrs  T(C): 36.9 (04 Jun 2022 18:39), Max: 37.1 (04 Jun 2022 10:20)  T(F): 98.4 (04 Jun 2022 18:39), Max: 98.8 (04 Jun 2022 10:20)  HR: 92 (04 Jun 2022 18:39) (85 - 92)  BP: 112/78 (04 Jun 2022 18:39) (103/72 - 123/86)  BP(mean): --  RR: 16 (04 Jun 2022 18:39) (16 - 16)  SpO2: 98% (04 Jun 2022 18:39) (98% - 99%)    PHYSICAL EXAM:      Constitutional: NAD, well-groomed, well-developed    Respiratory: CTAB  Cardiovascular: S1 and S2, RRR, no M/G/R    Extremities: No peripheral edema, no pedal lesions  right foot wrapped   Vascular: 2+ peripheral pulses          LABS:                        11.5   9.00  )-----------( 383      ( 04 Jun 2022 06:15 )             35.4     06-04    138  |  101  |  18.3  ----------------------------<  165<H>  4.0   |  25.0  |  1.28    Ca    9.6      04 Jun 2022 06:15  Phos  3.8     06-04  Mg     1.5     06-04    TPro  8.7  /  Alb  3.8  /  TBili  0.2<L>  /  DBili  x   /  AST  26  /  ALT  28  /  AlkPhos  80  06-04          CAPILLARY BLOOD GLUCOSE  CAPILLARY BLOOD GLUCOSE      POCT Blood Glucose.: 129 mg/dL (04 Jun 2022 21:45)  POCT Blood Glucose.: 112 mg/dL (04 Jun 2022 16:07)  POCT Blood Glucose.: 168 mg/dL (04 Jun 2022 11:11)  POCT Blood Glucose.: 218 mg/dL (04 Jun 2022 07:47)      RADIOLOGY & ADDITIONAL TESTS:  
INTERVAL HPI/OVERNIGHT EVENTS:  follow up T2DM   s/p right 5th toe amputaiton     doing better today      MEDICATIONS  (STANDING):  cyanocobalamin 1000 MICROGram(s) Oral daily  dextrose 5%. 1000 milliLiter(s) (100 mL/Hr) IV Continuous <Continuous>  dextrose 5%. 1000 milliLiter(s) (50 mL/Hr) IV Continuous <Continuous>  dextrose 5%. 1000 milliLiter(s) (100 mL/Hr) IV Continuous <Continuous>  dextrose 5%. 1000 milliLiter(s) (50 mL/Hr) IV Continuous <Continuous>  dextrose 50% Injectable 25 Gram(s) IV Push once  dextrose 50% Injectable 12.5 Gram(s) IV Push once  dextrose 50% Injectable 25 Gram(s) IV Push once  dextrose 50% Injectable 25 Gram(s) IV Push once  dextrose 50% Injectable 12.5 Gram(s) IV Push once  dextrose 50% Injectable 25 Gram(s) IV Push once  gabapentin 300 milliGRAM(s) Oral at bedtime  glucagon  Injectable 1 milliGRAM(s) IntraMuscular once  glucagon  Injectable 1 milliGRAM(s) IntraMuscular once  glucagon  Injectable 1 milliGRAM(s) IntraMuscular once  insulin glargine Injectable (LANTUS) 10 Unit(s) SubCutaneous at bedtime  insulin lispro (ADMELOG) corrective regimen sliding scale   SubCutaneous Before meals and at bedtime  insulin lispro Injectable (ADMELOG) 3 Unit(s) SubCutaneous three times a day before meals  piperacillin/tazobactam IVPB.. 3.375 Gram(s) IV Intermittent every 8 hours  vancomycin  IVPB 750 milliGRAM(s) IV Intermittent every 12 hours    MEDICATIONS  (PRN):  acetaminophen     Tablet .. 650 milliGRAM(s) Oral every 6 hours PRN Temp greater or equal to 38C (100.4F), Mild Pain (1 - 3)  aluminum hydroxide/magnesium hydroxide/simethicone Suspension 30 milliLiter(s) Oral every 4 hours PRN Dyspepsia  dextrose Oral Gel 15 Gram(s) Oral once PRN Blood Glucose LESS THAN 70 milliGRAM(s)/deciliter  dextrose Oral Gel 15 Gram(s) Oral once PRN Blood Glucose LESS THAN 70 milliGRAM(s)/deciliter  melatonin 3 milliGRAM(s) Oral at bedtime PRN Insomnia  ondansetron Injectable 4 milliGRAM(s) IV Push every 8 hours PRN Nausea and/or Vomiting      Allergies    No Known Allergies    Intolerances        Review of systems:    Vital Signs Last 24 Hrs  T(C): 36.8 (03 Jun 2022 15:44), Max: 36.8 (03 Jun 2022 15:44)  T(F): 98.2 (03 Jun 2022 15:44), Max: 98.2 (03 Jun 2022 15:44)  HR: 87 (03 Jun 2022 15:44) (87 - 95)  BP: 125/83 (03 Jun 2022 15:44) (125/83 - 135/89)  BP(mean): --  RR: 17 (03 Jun 2022 15:44) (17 - 17)  SpO2: 96% (03 Jun 2022 15:44) (96% - 98%)    PHYSICAL EXAM:      Constitutional: NAD, well-groomed, well-developed    Respiratory: CTAB  Cardiovascular: S1 and S2, RRR, no M/G/R    Extremities: No peripheral edema, no pedal lesions  Vascular: 2+ peripheral pulses  Neurological: A/O x 3, no focal deficits  Psychiatric: Normal mood, normal affect  Musculoskeletal: 5/5 strength b/l upper and lower extremities  Skin: No rashes, no acanthosis        LABS:                        11.1   11.42 )-----------( 414      ( 03 Jun 2022 06:00 )             33.6     06-03    134<L>  |  101  |  21.7<H>  ----------------------------<  207<H>  3.9   |  22.0  |  1.26    Ca    9.1      03 Jun 2022 06:00  Phos  3.2     06-03  Mg     1.7     06-03    TPro  8.4  /  Alb  3.6  /  TBili  0.4  /  DBili  x   /  AST  19  /  ALT  19  /  AlkPhos  67  06-03          CAPILLARY BLOOD GLUCOSE  CAPILLARY BLOOD GLUCOSE      POCT Blood Glucose.: 272 mg/dL (03 Jun 2022 22:25)  POCT Blood Glucose.: 297 mg/dL (03 Jun 2022 16:19)  POCT Blood Glucose.: 202 mg/dL (03 Jun 2022 11:20)  POCT Blood Glucose.: 183 mg/dL (03 Jun 2022 07:51)      RADIOLOGY & ADDITIONAL TESTS:  
INTERVAL HPI/OVERNIGHT EVENTS:  follwo up T2DM   foot ulcer   Pt reports to be feelign ok   no dressign change since Friday   MEDICATIONS  (STANDING):  cyanocobalamin 1000 MICROGram(s) Oral daily  dextrose 5%. 1000 milliLiter(s) (100 mL/Hr) IV Continuous <Continuous>  dextrose 5%. 1000 milliLiter(s) (50 mL/Hr) IV Continuous <Continuous>  dextrose 50% Injectable 25 Gram(s) IV Push once  dextrose 50% Injectable 12.5 Gram(s) IV Push once  dextrose 50% Injectable 25 Gram(s) IV Push once  gabapentin 300 milliGRAM(s) Oral at bedtime  glucagon  Injectable 1 milliGRAM(s) IntraMuscular once  insulin glargine Injectable (LANTUS) 10 Unit(s) SubCutaneous at bedtime  insulin lispro (ADMELOG) corrective regimen sliding scale   SubCutaneous Before meals and at bedtime  piperacillin/tazobactam IVPB.. 3.375 Gram(s) IV Intermittent every 8 hours  vancomycin  IVPB 750 milliGRAM(s) IV Intermittent every 12 hours    MEDICATIONS  (PRN):  acetaminophen     Tablet .. 650 milliGRAM(s) Oral every 6 hours PRN Temp greater or equal to 38C (100.4F), Mild Pain (1 - 3)  aluminum hydroxide/magnesium hydroxide/simethicone Suspension 30 milliLiter(s) Oral every 4 hours PRN Dyspepsia  dextrose Oral Gel 15 Gram(s) Oral once PRN Blood Glucose LESS THAN 70 milliGRAM(s)/deciliter  melatonin 3 milliGRAM(s) Oral at bedtime PRN Insomnia  ondansetron Injectable 4 milliGRAM(s) IV Push every 8 hours PRN Nausea and/or Vomiting      Allergies    No Known Allergies    Intolerances        Review of systems:    Vital Signs Last 24 Hrs  T(C): 37 (31 May 2022 17:23), Max: 37 (30 May 2022 20:45)  T(F): 98.6 (31 May 2022 17:23), Max: 98.6 (30 May 2022 20:45)  HR: 80 (31 May 2022 17:23) (72 - 94)  BP: 109/74 (31 May 2022 17:23) (102/66 - 145/68)  BP(mean): --  RR: 19 (31 May 2022 17:23) (18 - 20)  SpO2: 95% (31 May 2022 17:23) (95% - 99%)    PHYSICAL EXAM:      Constitutional: NAD, well-groomed, well-developed  HEENT: PERRLA, EOMI, no exophalmos    Respiratory: CTAB  Cardiovascular: S1 and S2, RRR, no M/G/R  Gastrointestinal: BS+, soft, no organomeglag or mass  Extremities: No peripheral edema, no pedal lesions  Vascular: 2+ peripheral pulses  Neurological: A/O x 3, no focal deficits  Psychiatric: Normal mood, normal affect  Skin: Right foot wrapped         LABS:                        11.0   8.57  )-----------( 346      ( 31 May 2022 05:29 )             34.6     05-31    136  |  101  |  19.3  ----------------------------<  107<H>  3.6   |  25.0  |  1.39<H>    Ca    8.8      31 May 2022 05:29  Phos  4.0     05-30  Mg     1.7     05-30            CAPILLARY BLOOD GLUCOSE    CAPILLARY BLOOD GLUCOSE      POCT Blood Glucose.: 142 mg/dL (31 May 2022 15:49)  POCT Blood Glucose.: 123 mg/dL (31 May 2022 11:33)  POCT Blood Glucose.: 134 mg/dL (31 May 2022 08:06)  POCT Blood Glucose.: 129 mg/dL (30 May 2022 22:06)    RADIOLOGY & ADDITIONAL TESTS:  
Patient is a 42y old  Male who presents with a chief complaint of foot OM (03 Jun 2022 15:00)      INTERVAL HPI/OVERNIGHT EVENTS: seen and examined, no complains     MEDICATIONS  (STANDING):  cyanocobalamin 1000 MICROGram(s) Oral daily  dextrose 5%. 1000 milliLiter(s) (50 mL/Hr) IV Continuous <Continuous>  dextrose 5%. 1000 milliLiter(s) (100 mL/Hr) IV Continuous <Continuous>  dextrose 5%. 1000 milliLiter(s) (50 mL/Hr) IV Continuous <Continuous>  dextrose 5%. 1000 milliLiter(s) (100 mL/Hr) IV Continuous <Continuous>  dextrose 50% Injectable 25 Gram(s) IV Push once  dextrose 50% Injectable 12.5 Gram(s) IV Push once  dextrose 50% Injectable 25 Gram(s) IV Push once  dextrose 50% Injectable 25 Gram(s) IV Push once  dextrose 50% Injectable 12.5 Gram(s) IV Push once  dextrose 50% Injectable 25 Gram(s) IV Push once  gabapentin 300 milliGRAM(s) Oral at bedtime  glucagon  Injectable 1 milliGRAM(s) IntraMuscular once  glucagon  Injectable 1 milliGRAM(s) IntraMuscular once  glucagon  Injectable 1 milliGRAM(s) IntraMuscular once  insulin glargine Injectable (LANTUS) 14 Unit(s) SubCutaneous at bedtime  insulin lispro (ADMELOG) corrective regimen sliding scale   SubCutaneous Before meals and at bedtime  insulin lispro Injectable (ADMELOG) 5 Unit(s) SubCutaneous three times a day before meals  piperacillin/tazobactam IVPB.. 3.375 Gram(s) IV Intermittent every 8 hours    MEDICATIONS  (PRN):  acetaminophen     Tablet .. 650 milliGRAM(s) Oral every 6 hours PRN Temp greater or equal to 38C (100.4F), Mild Pain (1 - 3)  aluminum hydroxide/magnesium hydroxide/simethicone Suspension 30 milliLiter(s) Oral every 4 hours PRN Dyspepsia  dextrose Oral Gel 15 Gram(s) Oral once PRN Blood Glucose LESS THAN 70 milliGRAM(s)/deciliter  dextrose Oral Gel 15 Gram(s) Oral once PRN Blood Glucose LESS THAN 70 milliGRAM(s)/deciliter  melatonin 3 milliGRAM(s) Oral at bedtime PRN Insomnia  ondansetron Injectable 4 milliGRAM(s) IV Push every 8 hours PRN Nausea and/or Vomiting      Allergies    No Known Allergies    Intolerances        REVIEW OF SYSTEMS:  CONSTITUTIONAL: No fever, weight loss, or fatigue  RESPIRATORY: No cough, wheezing, chills or hemoptysis; No shortness of breath  CARDIOVASCULAR: No chest pain, palpitations, dizziness, or leg swelling  GASTROINTESTINAL: No abdominal or epigastric pain. No nausea, vomiting, or hematemesis; No diarrhea or constipation. No melena or hematochezia.  NEUROLOGICAL: No headaches, memory loss, loss of strength, numbness, or tremors  MUSCULOSKELETAL: No joint pain or swelling; No muscle, back, or extremity pain      Vital Signs Last 24 Hrs  T(C): 37.1 (04 Jun 2022 10:20), Max: 37.1 (04 Jun 2022 10:20)  T(F): 98.8 (04 Jun 2022 10:20), Max: 98.8 (04 Jun 2022 10:20)  HR: 88 (04 Jun 2022 10:20) (85 - 88)  BP: 103/72 (04 Jun 2022 10:20) (103/72 - 125/83)  BP(mean): --  RR: 16 (04 Jun 2022 10:20) (16 - 17)  SpO2: 99% (04 Jun 2022 10:20) (96% - 99%)    PHYSICAL EXAM:  GENERAL: NAD,   HEAD:  Atraumatic, Normocephalic  EYES: EOMI, PERRLA, conjunctiva and sclera clear  NECK: Supple, No JVD, Normal thyroid  NERVOUS SYSTEM:  Alert & Oriented X3, No gross focal deficits  CHEST/LUNG: Clear to percussion bilaterally; No rales, rhonchi, wheezing, or rubs  HEART: Regular rate and rhythm; No murmurs, rubs, or gallops  ABDOMEN: Soft, Nontender, Nondistended; Bowel sounds present  EXTREMITIES:  No clubbing, cyanosis, or edema, right foot in dressing   SKIN: No rashes or lesions    LABS:                        11.5   9.00  )-----------( 383      ( 04 Jun 2022 06:15 )             35.4     06-04    138  |  101  |  18.3  ----------------------------<  165<H>  4.0   |  25.0  |  1.28    Ca    9.6      04 Jun 2022 06:15  Phos  3.8     06-04  Mg     1.5     06-04    TPro  8.7  /  Alb  3.8  /  TBili  0.2<L>  /  DBili  x   /  AST  26  /  ALT  28  /  AlkPhos  80  06-04        CAPILLARY BLOOD GLUCOSE      POCT Blood Glucose.: 168 mg/dL (04 Jun 2022 11:11)  POCT Blood Glucose.: 218 mg/dL (04 Jun 2022 07:47)  POCT Blood Glucose.: 272 mg/dL (03 Jun 2022 22:25)  POCT Blood Glucose.: 297 mg/dL (03 Jun 2022 16:19)      RADIOLOGY & ADDITIONAL TESTS:    Imaging Personally Reviewed:  [ ] YES  [ ] NO    Consultant(s) Notes Reviewed:  [x ] YES  [ ] NO    Care Discussed with Consultants/Other Providers [ ] YES  [ ] NO    Plan of Care discussed with Housestaff [ ]YES [ ] NO
Patient is a 42y old  Male who presents with a chief complaint of foot OM (04 Jun 2022 16:30)      INTERVAL HPI/OVERNIGHT EVENTS: seen and examined. No complains.     MEDICATIONS  (STANDING):  cyanocobalamin 1000 MICROGram(s) Oral daily  dextrose 5%. 1000 milliLiter(s) (50 mL/Hr) IV Continuous <Continuous>  dextrose 5%. 1000 milliLiter(s) (100 mL/Hr) IV Continuous <Continuous>  dextrose 5%. 1000 milliLiter(s) (50 mL/Hr) IV Continuous <Continuous>  dextrose 5%. 1000 milliLiter(s) (100 mL/Hr) IV Continuous <Continuous>  dextrose 50% Injectable 25 Gram(s) IV Push once  dextrose 50% Injectable 12.5 Gram(s) IV Push once  dextrose 50% Injectable 25 Gram(s) IV Push once  dextrose 50% Injectable 25 Gram(s) IV Push once  dextrose 50% Injectable 12.5 Gram(s) IV Push once  dextrose 50% Injectable 25 Gram(s) IV Push once  gabapentin 300 milliGRAM(s) Oral at bedtime  glucagon  Injectable 1 milliGRAM(s) IntraMuscular once  glucagon  Injectable 1 milliGRAM(s) IntraMuscular once  glucagon  Injectable 1 milliGRAM(s) IntraMuscular once  insulin glargine Injectable (LANTUS) 14 Unit(s) SubCutaneous at bedtime  insulin lispro (ADMELOG) corrective regimen sliding scale   SubCutaneous Before meals and at bedtime  insulin lispro Injectable (ADMELOG) 5 Unit(s) SubCutaneous three times a day before meals  piperacillin/tazobactam IVPB.. 3.375 Gram(s) IV Intermittent every 8 hours    MEDICATIONS  (PRN):  acetaminophen     Tablet .. 650 milliGRAM(s) Oral every 6 hours PRN Temp greater or equal to 38C (100.4F), Mild Pain (1 - 3)  aluminum hydroxide/magnesium hydroxide/simethicone Suspension 30 milliLiter(s) Oral every 4 hours PRN Dyspepsia  dextrose Oral Gel 15 Gram(s) Oral once PRN Blood Glucose LESS THAN 70 milliGRAM(s)/deciliter  dextrose Oral Gel 15 Gram(s) Oral once PRN Blood Glucose LESS THAN 70 milliGRAM(s)/deciliter  melatonin 3 milliGRAM(s) Oral at bedtime PRN Insomnia  ondansetron Injectable 4 milliGRAM(s) IV Push every 8 hours PRN Nausea and/or Vomiting      Allergies    No Known Allergies    Intolerances        REVIEW OF SYSTEMS:  CONSTITUTIONAL: No fever, weight loss, or fatigue  RESPIRATORY: No cough, wheezing, chills or hemoptysis; No shortness of breath  CARDIOVASCULAR: No chest pain, palpitations, dizziness, or leg swelling  GASTROINTESTINAL: No abdominal or epigastric pain. No nausea, vomiting, or hematemesis; No diarrhea or constipation. No melena or hematochezia.  NEUROLOGICAL: No headaches, memory loss, loss of strength, numbness, or tremors  MUSCULOSKELETAL: No joint pain or swelling; No muscle, back, or extremity pain      Vital Signs Last 24 Hrs  T(C): 36.9 (05 Jun 2022 04:03), Max: 37.1 (04 Jun 2022 10:20)  T(F): 98.5 (05 Jun 2022 04:03), Max: 98.8 (04 Jun 2022 10:20)  HR: 79 (05 Jun 2022 04:03) (79 - 92)  BP: 116/82 (05 Jun 2022 04:03) (103/72 - 116/82)  BP(mean): --  RR: 18 (05 Jun 2022 04:03) (16 - 18)  SpO2: 98% (05 Jun 2022 04:03) (98% - 99%)    PHYSICAL EXAM:  GENERAL: NAD,   HEAD:  Atraumatic, Normocephalic  EYES: EOMI, PERRLA, conjunctiva and sclera clear  NECK: Supple, No JVD, Normal thyroid  NERVOUS SYSTEM:  Alert & Oriented X3, No gross focal deficits  CHEST/LUNG: Clear to percussion bilaterally; No rales, rhonchi, wheezing, or rubs  HEART: Regular rate and rhythm; No murmurs, rubs, or gallops  ABDOMEN: Soft, Nontender, Nondistended; Bowel sounds present  EXTREMITIES:  right foot in dressing No clubbing, cyanosis, or edema  SKIN: No rashes or lesions    LABS:                        11.5   9.00  )-----------( 383      ( 04 Jun 2022 06:15 )             35.4     06-04    138  |  101  |  18.3  ----------------------------<  165<H>  4.0   |  25.0  |  1.28    Ca    9.6      04 Jun 2022 06:15  Phos  3.8     06-04  Mg     1.5     06-04    TPro  8.7  /  Alb  3.8  /  TBili  0.2<L>  /  DBili  x   /  AST  26  /  ALT  28  /  AlkPhos  80  06-04        CAPILLARY BLOOD GLUCOSE      POCT Blood Glucose.: 135 mg/dL (05 Jun 2022 07:39)  POCT Blood Glucose.: 129 mg/dL (04 Jun 2022 21:45)  POCT Blood Glucose.: 112 mg/dL (04 Jun 2022 16:07)  POCT Blood Glucose.: 168 mg/dL (04 Jun 2022 11:11)      RADIOLOGY & ADDITIONAL TESTS:    Imaging Personally Reviewed:  [ ] YES  [ ] NO    Consultant(s) Notes Reviewed:  [x ] YES  [ ] NO    Care Discussed with Consultants/Other Providers [x ] YES  [ ] NO    Plan of Care discussed with Housestaff [ ]YES [ ] NO
Rai Howard M.D.    Patient is a 42y old  Male who presents with a chief complaint of foot OM (02 Jun 2022 09:12)      SUBJECTIVE / OVERNIGHT EVENTS: no concerns. Pain well controlled.    Patient denies chest pain, SOB, abd pain, N/V, fever, chills, dysuria or any other complaints. All remainder ROS negative.     MEDICATIONS  (STANDING):  cyanocobalamin 1000 MICROGram(s) Oral daily  dextrose 5%. 1000 milliLiter(s) (50 mL/Hr) IV Continuous <Continuous>  dextrose 5%. 1000 milliLiter(s) (100 mL/Hr) IV Continuous <Continuous>  dextrose 5%. 1000 milliLiter(s) (100 mL/Hr) IV Continuous <Continuous>  dextrose 5%. 1000 milliLiter(s) (50 mL/Hr) IV Continuous <Continuous>  dextrose 50% Injectable 25 Gram(s) IV Push once  dextrose 50% Injectable 12.5 Gram(s) IV Push once  dextrose 50% Injectable 25 Gram(s) IV Push once  dextrose 50% Injectable 25 Gram(s) IV Push once  dextrose 50% Injectable 12.5 Gram(s) IV Push once  dextrose 50% Injectable 25 Gram(s) IV Push once  gabapentin 300 milliGRAM(s) Oral at bedtime  glucagon  Injectable 1 milliGRAM(s) IntraMuscular once  glucagon  Injectable 1 milliGRAM(s) IntraMuscular once  glucagon  Injectable 1 milliGRAM(s) IntraMuscular once  insulin glargine Injectable (LANTUS) 10 Unit(s) SubCutaneous at bedtime  insulin lispro (ADMELOG) corrective regimen sliding scale   SubCutaneous Before meals and at bedtime  insulin lispro Injectable (ADMELOG) 3 Unit(s) SubCutaneous three times a day before meals  piperacillin/tazobactam IVPB.. 3.375 Gram(s) IV Intermittent every 8 hours  vancomycin  IVPB 750 milliGRAM(s) IV Intermittent every 12 hours    MEDICATIONS  (PRN):  acetaminophen     Tablet .. 650 milliGRAM(s) Oral every 6 hours PRN Temp greater or equal to 38C (100.4F), Mild Pain (1 - 3)  aluminum hydroxide/magnesium hydroxide/simethicone Suspension 30 milliLiter(s) Oral every 4 hours PRN Dyspepsia  dextrose Oral Gel 15 Gram(s) Oral once PRN Blood Glucose LESS THAN 70 milliGRAM(s)/deciliter  dextrose Oral Gel 15 Gram(s) Oral once PRN Blood Glucose LESS THAN 70 milliGRAM(s)/deciliter  melatonin 3 milliGRAM(s) Oral at bedtime PRN Insomnia  ondansetron Injectable 4 milliGRAM(s) IV Push every 8 hours PRN Nausea and/or Vomiting      I&O's Summary      PHYSICAL EXAM:  Vital Signs Last 24 Hrs  T(C): 36.6 (03 Jun 2022 04:17), Max: 36.6 (02 Jun 2022 16:59)  T(F): 97.9 (03 Jun 2022 04:17), Max: 97.9 (02 Jun 2022 16:59)  HR: 103 (03 Jun 2022 04:17) (69 - 103)  BP: 102/67 (03 Jun 2022 04:17) (95/61 - 137/89)  BP(mean): --  RR: 18 (03 Jun 2022 04:17) (11 - 18)  SpO2: 98% (03 Jun 2022 04:17) (95% - 100%)    CONSTITUTIONAL: NAD, well-groomed  ENMT: Moist oral mucosa, no pharyngeal injection or exudates; normal dentition  RESPIRATORY: Normal respiratory effort; lungs are clear to auscultation bilaterally  CARDIOVASCULAR: Regular rate and rhythm, normal S1 and S2, no murmur/rub/gallop; No lower extremity edema; Peripheral pulses are 2+ bilaterally  ABDOMEN: Nontender to palpation, normoactive bowel sounds, no rebound/guarding; No hepatosplenomegaly  MUSCLOSKELETAL:  Normal gait; no clubbing or cyanosis of digits; no joint swelling or tenderness to palpation  PSYCH: A+O x3; affect appropriate  NEUROLOGY: CN 2-12 are intact and symmetric; no gross sensory deficits;   SKIN:  Right toe in dressing and ace wrap    LABS:                        11.1   11.42 )-----------( 414      ( 03 Jun 2022 06:00 )             33.6     06-03    134<L>  |  101  |  21.7<H>  ----------------------------<  207<H>  3.9   |  22.0  |  1.26    Ca    9.1      03 Jun 2022 06:00  Phos  3.2     06-03  Mg     1.7     06-03    TPro  8.4  /  Alb  3.6  /  TBili  0.4  /  DBili  x   /  AST  19  /  ALT  19  /  AlkPhos  67  06-03              Culture - Tissue with Gram Stain (collected 02 Jun 2022 22:19)  Source: .Tissue Bone Right Foot Culture  Gram Stain (03 Jun 2022 05:19):    No polymorphonuclear leukocytes seen per low power field    No organisms seen per oil power field      CAPILLARY BLOOD GLUCOSE      POCT Blood Glucose.: 183 mg/dL (03 Jun 2022 07:51)  POCT Blood Glucose.: 285 mg/dL (02 Jun 2022 22:27)  POCT Blood Glucose.: 162 mg/dL (02 Jun 2022 16:15)  POCT Blood Glucose.: 107 mg/dL (02 Jun 2022 11:55)      RADIOLOGY & ADDITIONAL TESTS:  Results Reviewed:   Imaging Personally Reviewed:  Electrocardiogram Personally Reviewed:
Rai Howard M.D.    Patient is a 42y old  Male who presents with a chief complaint of foot OM (29 May 2022 20:13)      SUBJECTIVE / OVERNIGHT EVENTS:  Mary - no concerns. Feels well, toe pain well controlled.     Patient denies chest pain, SOB, abd pain, N/V, fever, chills, dysuria or any other complaints. All remainder ROS negative.     MEDICATIONS  (STANDING):  dextrose 5%. 1000 milliLiter(s) (50 mL/Hr) IV Continuous <Continuous>  dextrose 5%. 1000 milliLiter(s) (100 mL/Hr) IV Continuous <Continuous>  dextrose 50% Injectable 25 Gram(s) IV Push once  dextrose 50% Injectable 12.5 Gram(s) IV Push once  dextrose 50% Injectable 25 Gram(s) IV Push once  gabapentin 300 milliGRAM(s) Oral at bedtime  glucagon  Injectable 1 milliGRAM(s) IntraMuscular once  insulin glargine Injectable (LANTUS) 10 Unit(s) SubCutaneous at bedtime  insulin lispro (ADMELOG) corrective regimen sliding scale   SubCutaneous Before meals and at bedtime  piperacillin/tazobactam IVPB.. 3.375 Gram(s) IV Intermittent every 8 hours  vancomycin  IVPB 1250 milliGRAM(s) IV Intermittent every 12 hours    MEDICATIONS  (PRN):  acetaminophen     Tablet .. 650 milliGRAM(s) Oral every 6 hours PRN Temp greater or equal to 38C (100.4F), Mild Pain (1 - 3)  aluminum hydroxide/magnesium hydroxide/simethicone Suspension 30 milliLiter(s) Oral every 4 hours PRN Dyspepsia  dextrose Oral Gel 15 Gram(s) Oral once PRN Blood Glucose LESS THAN 70 milliGRAM(s)/deciliter  melatonin 3 milliGRAM(s) Oral at bedtime PRN Insomnia  ondansetron Injectable 4 milliGRAM(s) IV Push every 8 hours PRN Nausea and/or Vomiting      I&O's Summary      PHYSICAL EXAM:  Vital Signs Last 24 Hrs  T(C): 37.1 (30 May 2022 08:37), Max: 37.5 (29 May 2022 15:39)  T(F): 98.8 (30 May 2022 08:37), Max: 99.5 (29 May 2022 15:39)  HR: 75 (30 May 2022 08:37) (75 - 85)  BP: 155/76 (30 May 2022 08:37) (100/68 - 155/76)  BP(mean): --  RR: 18 (30 May 2022 08:37) (16 - 18)  SpO2: 98% (30 May 2022 08:37) (96% - 100%)    CONSTITUTIONAL: NAD, well-groomed  ENMT: Moist oral mucosa, no pharyngeal injection or exudates; normal dentition  RESPIRATORY: Normal respiratory effort; lungs are clear to auscultation bilaterally  CARDIOVASCULAR: Regular rate and rhythm, normal S1 and S2, no murmur/rub/gallop; No lower extremity edema; Peripheral pulses are 2+ bilaterally  ABDOMEN: Nontender to palpation, normoactive bowel sounds, no rebound/guarding; No hepatosplenomegaly  MUSCLOSKELETAL:  Normal gait; no clubbing or cyanosis of digits; no joint swelling or tenderness to palpation  PSYCH: A+O x3; affect appropriate  NEUROLOGY: CN 2-12 are intact and symmetric; no gross sensory deficits;   SKIN:  Right toe ulcer with drainage noted.       LABS:                        11.2   10.19 )-----------( 346      ( 30 May 2022 04:18 )             35.0     05-30    139  |  104  |  16.8  ----------------------------<  90  3.9   |  25.0  |  1.12    Ca    8.8      30 May 2022 04:18  Phos  4.0     05-30  Mg     1.7     05-30    TPro  9.5<H>  /  Alb  4.2  /  TBili  0.3<L>  /  DBili  x   /  AST  31  /  ALT  25  /  AlkPhos  97  05-29    PT/INR - ( 29 May 2022 11:52 )   PT: 13.2 sec;   INR: 1.14 ratio         PTT - ( 29 May 2022 11:52 )  PTT:36.0 sec          CAPILLARY BLOOD GLUCOSE      POCT Blood Glucose.: 88 mg/dL (30 May 2022 07:38)  POCT Blood Glucose.: 113 mg/dL (29 May 2022 21:05)      RADIOLOGY & ADDITIONAL TESTS:  Results Reviewed:   Imaging Personally Reviewed:  Electrocardiogram Personally Reviewed:
Rai Howard M.D.    Patient is a 42y old  Male who presents with a chief complaint of foot OM (31 May 2022 10:32)      SUBJECTIVE / OVERNIGHT EVENTS: no concerns. Toe pain well controlled. Vanc trough 21.2, dose decreased to 1000mg    Patient denies chest pain, SOB, abd pain, N/V, fever, chills, dysuria or any other complaints. All remainder ROS negative.     MEDICATIONS  (STANDING):  dextrose 5%. 1000 milliLiter(s) (50 mL/Hr) IV Continuous <Continuous>  dextrose 5%. 1000 milliLiter(s) (100 mL/Hr) IV Continuous <Continuous>  dextrose 50% Injectable 25 Gram(s) IV Push once  dextrose 50% Injectable 12.5 Gram(s) IV Push once  dextrose 50% Injectable 25 Gram(s) IV Push once  gabapentin 300 milliGRAM(s) Oral at bedtime  glucagon  Injectable 1 milliGRAM(s) IntraMuscular once  insulin glargine Injectable (LANTUS) 10 Unit(s) SubCutaneous at bedtime  insulin lispro (ADMELOG) corrective regimen sliding scale   SubCutaneous Before meals and at bedtime  piperacillin/tazobactam IVPB.. 3.375 Gram(s) IV Intermittent every 8 hours  vancomycin  IVPB 1000 milliGRAM(s) IV Intermittent every 12 hours    MEDICATIONS  (PRN):  acetaminophen     Tablet .. 650 milliGRAM(s) Oral every 6 hours PRN Temp greater or equal to 38C (100.4F), Mild Pain (1 - 3)  aluminum hydroxide/magnesium hydroxide/simethicone Suspension 30 milliLiter(s) Oral every 4 hours PRN Dyspepsia  dextrose Oral Gel 15 Gram(s) Oral once PRN Blood Glucose LESS THAN 70 milliGRAM(s)/deciliter  melatonin 3 milliGRAM(s) Oral at bedtime PRN Insomnia  ondansetron Injectable 4 milliGRAM(s) IV Push every 8 hours PRN Nausea and/or Vomiting      I&O's Summary      PHYSICAL EXAM:  Vital Signs Last 24 Hrs  T(C): 36.8 (31 May 2022 04:17), Max: 37.4 (30 May 2022 16:13)  T(F): 98.2 (31 May 2022 04:17), Max: 99.4 (30 May 2022 16:13)  HR: 72 (31 May 2022 04:17) (72 - 94)  BP: 102/66 (31 May 2022 04:17) (102/66 - 145/68)  BP(mean): --  RR: 18 (31 May 2022 04:17) (18 - 18)  SpO2: 97% (31 May 2022 04:17) (97% - 99%)    CONSTITUTIONAL: NAD, well-groomed  ENMT: Moist oral mucosa, no pharyngeal injection or exudates; normal dentition  RESPIRATORY: Normal respiratory effort; lungs are clear to auscultation bilaterally  CARDIOVASCULAR: Regular rate and rhythm, normal S1 and S2, no murmur/rub/gallop; No lower extremity edema; Peripheral pulses are 2+ bilaterally  ABDOMEN: Nontender to palpation, normoactive bowel sounds, no rebound/guarding; No hepatosplenomegaly  MUSCLOSKELETAL:  Normal gait; no clubbing or cyanosis of digits; no joint swelling or tenderness to palpation  PSYCH: A+O x3; affect appropriate  NEUROLOGY: CN 2-12 are intact and symmetric; no gross sensory deficits;   SKIN: No rashes; no palpable lesions    LABS:                        11.0   8.57  )-----------( 346      ( 31 May 2022 05:29 )             34.6     05-31    136  |  101  |  19.3  ----------------------------<  107<H>  3.6   |  25.0  |  1.39<H>    Ca    8.8      31 May 2022 05:29  Phos  4.0     05-30  Mg     1.7     05-30    TPro  9.5<H>  /  Alb  4.2  /  TBili  0.3<L>  /  DBili  x   /  AST  31  /  ALT  25  /  AlkPhos  97  05-29    PT/INR - ( 29 May 2022 11:52 )   PT: 13.2 sec;   INR: 1.14 ratio         PTT - ( 29 May 2022 11:52 )  PTT:36.0 sec          CAPILLARY BLOOD GLUCOSE      POCT Blood Glucose.: 134 mg/dL (31 May 2022 08:06)  POCT Blood Glucose.: 129 mg/dL (30 May 2022 22:06)  POCT Blood Glucose.: 111 mg/dL (30 May 2022 16:38)  POCT Blood Glucose.: 132 mg/dL (30 May 2022 11:15)      RADIOLOGY & ADDITIONAL TESTS:  Results Reviewed:   Imaging Personally Reviewed:  Electrocardiogram Personally Reviewed:

## 2022-06-08 NOTE — DISCHARGE NOTE NURSING/CASE MANAGEMENT/SOCIAL WORK - NSDCVIVACCINE_GEN_ALL_CORE_FT
Tdap; 17-Sep-2019 18:03; Herberth Contreras); [employer]; [id number] (Exp. Date: 26-Jul-2021); IntraMuscular; Deltoid Left.; 0.5 milliLiter(s); VIS (VIS Published: 09-May-2013, VIS Presented: 17-Sep-2019);

## 2022-06-08 NOTE — PROGRESS NOTE ADULT - PROVIDER SPECIALTY LIST ADULT
Endocrinology
Hospitalist
Hospitalist
Infectious Disease
Podiatry
Podiatry
Hospitalist
Hospitalist
Infectious Disease
Podiatry
Endocrinology
Endocrinology
Hospitalist
Hospitalist
Infectious Disease
Podiatry
Hospitalist
Podiatry
Podiatry

## 2022-06-08 NOTE — DISCHARGE NOTE NURSING/CASE MANAGEMENT/SOCIAL WORK - PATIENT PORTAL LINK FT
You can access the FollowMyHealth Patient Portal offered by [facility] by registering at the following website: [email or web address]. By joining [facility]’s FollowMyHealth portal, you will also be able to view your health information using other applications (apps) compatible with our system.

## 2022-06-08 NOTE — PROGRESS NOTE ADULT - ASSESSMENT
41 y/o M with PMHx IDDM who presents to the ED c/o right 5th toe infection, admitted for toe OM.    1. Toe osteomyelitis    s/p OR 6/3 for resection, surgical cultures growing staph aureas    Meropenem added on 6/6  c/w  Vancomycin   awaiting antibiotics set up   Vanco trough should be followed.       2. Mild anemia of chronic disease, stable     3. Uncontrolled type 2 DM with neuropathy   HBA1C 9.3, BG controlled   - on home NPH 60U QD  - c/w home Gabapentin  c/w Lantus 10U + ISS, up titrate as needed    DVT: Ambulate as tolerated, SCD  Diet: Consistent carb  Dispo: discharge home today

## 2022-06-08 NOTE — PROGRESS NOTE ADULT - REASON FOR ADMISSION
foot OM

## 2022-06-08 NOTE — DISCHARGE NOTE NURSING/CASE MANAGEMENT/SOCIAL WORK - NSDCPEFALRISK_GEN_ALL_CORE
For information on Fall & Injury Prevention, visit: [email or web address] OR  [email or web address] OR  https://www.cdc.gov/steadi/patient.html

## 2022-06-14 PROBLEM — Z00.00 ENCOUNTER FOR PREVENTIVE HEALTH EXAMINATION: Status: ACTIVE | Noted: 2022-06-14

## 2022-06-15 LAB — SURGICAL PATHOLOGY STUDY: SIGNIFICANT CHANGE UP

## 2022-06-23 ENCOUNTER — APPOINTMENT (OUTPATIENT)
Dept: INTERNAL MEDICINE | Facility: CLINIC | Age: 42
End: 2022-06-23
Payer: MEDICAID

## 2022-06-23 VITALS
WEIGHT: 146 LBS | DIASTOLIC BLOOD PRESSURE: 74 MMHG | OXYGEN SATURATION: 98 % | BODY MASS INDEX: 25.87 KG/M2 | SYSTOLIC BLOOD PRESSURE: 108 MMHG | HEIGHT: 63 IN | RESPIRATION RATE: 16 BRPM | HEART RATE: 94 BPM

## 2022-06-23 DIAGNOSIS — S98.131A COMPLETE TRAUMATIC AMPUTATION OF ONE RIGHT LESSER TOE, INITIAL ENCOUNTER: ICD-10-CM

## 2022-06-23 PROCEDURE — 99214 OFFICE O/P EST MOD 30 MIN: CPT

## 2022-06-23 NOTE — ASSESSMENT
[FreeTextEntry1] : At the current time, he is clinically improving.\par \par Will continue his meropenem 1 g every 8 hours.  We will continue his vancomycin, by trough levels.\par \par He is to complete his antibiotics on July 3, 2022.\par \eunice Will continue to follow-up with him afterwards.\par \par Patient has been instructed to follow-up with the podiatrist for proper removal of his sutures next week.\eunice \eunice will call the patient once the results are in.

## 2022-06-23 NOTE — REVIEW OF SYSTEMS
[As Noted in HPI] : as noted in HPI [Skin Lesions] : skin lesion [Skin Wound] : skin wound [Negative] : Heme/Lymph [de-identified] : He still has a right foot wound, no significant drainage.  Getting home dressing care.

## 2022-06-23 NOTE — PHYSICAL EXAM
[General Appearance - Alert] : alert [General Appearance - In No Acute Distress] : in no acute distress [Sclera] : the sclera and conjunctiva were normal [PERRL With Normal Accommodation] : pupils were equal in size, round, reactive to light [Extraocular Movements] : extraocular movements were intact [Outer Ear] : the ears and nose were normal in appearance [Oropharynx] : the oropharynx was normal with no thrush [Neck Appearance] : the appearance of the neck was normal [Neck Cervical Mass (___cm)] : no neck mass was observed [Jugular Venous Distention Increased] : there was no jugular-venous distention [Thyroid Diffuse Enlargement] : the thyroid was not enlarged [Auscultation Breath Sounds / Voice Sounds] : lungs were clear to auscultation bilaterally [Heart Rate And Rhythm] : heart rate was normal and rhythm regular [Heart Sounds] : normal S1 and S2 [Heart Sounds Gallop] : no gallops [Murmurs] : no murmurs [Heart Sounds Pericardial Friction Rub] : no pericardial rub [Full Pulse] : the pedal pulses are present [Edema] : there was no peripheral edema [Bowel Sounds] : normal bowel sounds [Abdomen Soft] : soft [Abdomen Tenderness] : non-tender [Abdomen Mass (___ Cm)] : no abdominal mass palpated [Costovertebral Angle Tenderness] : no CVA tenderness [No Palpable Adenopathy] : no palpable adenopathy [Musculoskeletal - Swelling] : no joint swelling [Nail Clubbing] : no clubbing  or cyanosis of the fingernails [Motor Tone] : muscle strength and tone were normal [Skin Color & Pigmentation] : normal skin color and pigmentation [] : no rash [FreeTextEntry1] : Right foot status post resection of the fifth digit, incision site is clean and well approximated.  Sutures are in place.  In the midpole there is some mild dehiscence.  No significant drainage. [Cranial Nerves] : cranial nerves 2-12 were intact [Sensation] : the sensory exam was normal to light touch and pinprick [No Focal Deficits] : no focal deficits [Oriented To Time, Place, And Person] : oriented to person, place, and time [Affect] : the affect was normal

## 2022-06-23 NOTE — DATA REVIEWED
[FreeTextEntry1] : \par =======================================================\par Current Antibiotics:\par meropenem IVPB 2000 milliGRAM(s) IV Intermittent every 8 hours\par vancomycin IVPB 1000 milliGRAM(s) IV Intermittent every 12 hours\par \par Other medications:\par cyanocobalamin 1000 MICROGram(s) Oral daily\par dextrose 5%. 1000 milliLiter(s) IV Continuous <Continuous>\par dextrose 5%. 1000 milliLiter(s) IV Continuous <Continuous>\par dextrose 5%. 1000 milliLiter(s) IV Continuous <Continuous>\par dextrose 5%. 1000 milliLiter(s) IV Continuous <Continuous>\par dextrose 50% Injectable 25 Gram(s) IV Push once\par dextrose 50% Injectable 12.5 Gram(s) IV Push once\par dextrose 50% Injectable 25 Gram(s) IV Push once\par dextrose 50% Injectable 25 Gram(s) IV Push once\par dextrose 50% Injectable 12.5 Gram(s) IV Push once\par dextrose 50% Injectable 25 Gram(s) IV Push once\par gabapentin 300 milliGRAM(s) Oral at bedtime\par glucagon Injectable 1 milliGRAM(s) IntraMuscular once\par glucagon Injectable 1 milliGRAM(s) IntraMuscular once\par glucagon Injectable 1 milliGRAM(s) IntraMuscular once\par insulin glargine Injectable (LANTUS) 14 Unit(s) SubCutaneous at bedtime\par insulin lispro (ADMELOG) corrective regimen sliding scale SubCutaneous Before\par meals and at bedtime\par insulin lispro Injectable (ADMELOG) 5 Unit(s) SubCutaneous three times a day\par before meals\par \par \par =======================================================\par Labs:\par \par \par \par \par \par Culture - Tissue with Gram Stain (collected 06-02-22 @ 22:19)\par Source: .Tissue Bone Right Foot Culture\par Gram Stain (06-03-22 @ 05:19):\par  No polymorphonuclear leukocytes seen per low power field\par  No organisms seen per oil power field\par \par Culture - Surgical Swab (collected 06-02-22 @ 22:18)\par Source: .Surgical Swab Deep Wound Culture 2/2\par Organism: Methicillin resistant Staphylococcus aureus\par Escherichia coli ESBL (06-06-22 @ 08:26)\par Organism: Escherichia coli ESBL (06-06-22 @ 08:26)\par  Sensitivities:\par  - Amikacin: S <=16\par  - Amoxicillin/Clavulanic Acid: S <=8/4\par  - Ampicillin: R >16 These ampicillin results predict results for\par amoxicillin\par  - Ampicillin/Sulbactam: R 8/4 Enterobacter, Klebsiella aerogenes,\par Citrobacter, and Serratia may develop resistance during prolonged therapy (3-4\par days)\par  - Aztreonam: R >16\par  - Cefazolin: R >16 Enterobacter, Klebsiella aerogenes, Citrobacter, and\par Serratia may develop resistance during prolonged therapy (3-4 days)\par  - Cefepime: R >16\par  - Ceftriaxone: R >32 Enterobacter, Klebsiella aerogenes, Citrobacter, and\par Serratia may develop resistance during prolonged therapy\par  - Ciprofloxacin: R >2\par  - Ertapenem: S <=0.5\par  - Gentamicin: R >8\par  - Imipenem: S <=1\par  - Levofloxacin: R >4\par  - Meropenem: S <=1\par  - Piperacillin/Tazobactam: R <=8\par  - Tobramycin: R >8\par  - Trimethoprim/Sulfamethoxazole: S <=0.5/9.5\par  Method Type: DARREN\par Organism: Methicillin resistant Staphylococcus aureus (06-05-22 @ 14:38)\par  Sensitivities:\par  - Ampicillin/Sulbactam: R 16/8\par  - Cefazolin: R 8\par  - Clindamycin: R >4\par  - Daptomycin: S 1\par  - Erythromycin: R >4\par  - Gentamicin: R >8 Should not be used as monotherapy\par  - Linezolid: S 2\par  - Oxacillin: R >2\par  - Penicillin: R >8\par  - Rifampin: S <=1 Should not be used as monotherapy\par  - Tetra/Doxy: S <=1\par  - Trimethoprim/Sulfamethoxazole: S <=0.5/9.5\par  - Vancomycin: S 2\par  Method Type: DARREN\par \par Culture - Surgical Swab (collected 06-02-22 @ 22:18)\par Source: .Surgical Swab Deep Wound Culture 1/2\par Organism: Methicillin resistant Staphylococcus aureus\par Escherichia coli\par Pseudomonas aeruginosa (06-06-22 @ 08:42)\par Organism: Pseudomonas aeruginosa (06-06-22 @ 08:42)\par  Sensitivities:\par  - Amikacin: S <=16\par  - Aztreonam: I 16\par  - Cefepime: I 16\par  - Ceftazidime: I 16\par  - Ciprofloxacin: S <=0.25\par  - Gentamicin: I 8\par  - Imipenem: S <=1\par  - Levofloxacin: S 1\par  - Meropenem: S <=1\par  - Piperacillin/Tazobactam: R >64\par  - Tobramycin: S <=2\par  Method Type: DARREN\par Organism: Escherichia coli (06-05-22 @ 12:46)\par  Sensitivities:\par  - Amikacin: S <=16\par  - Amoxicillin/Clavulanic Acid: I 16/8\par  - Ampicillin: R >16 These ampicillin results predict results for\par amoxicillin\par  - Ampicillin/Sulbactam: R >16/8 Enterobacter, Klebsiella aerogenes,\par Citrobacter, and Serratia may develop resistance during prolonged therapy (3-4\par days)\par  - Aztreonam: S <=4\par  - Cefazolin: R 8 Enterobacter, Klebsiella aerogenes, Citrobacter, and\par Serratia may develop resistance during prolonged therapy (3-4 days)\par  - Cefepime: S <=2\par  - Cefoxitin: S <=8\par  - Ceftriaxone: S <=1 Enterobacter, Klebsiella aerogenes, Citrobacter, and\par Serratia may develop resistance during prolonged therapy\par  - Ciprofloxacin: R >2\par  - Ertapenem: S <=0.5\par  - Gentamicin: R >8\par  - Imipenem: S <=1\par  - Levofloxacin: R >4\par  - Meropenem: S <=1\par  - Piperacillin/Tazobactam: S 16\par  - Tobramycin: R >8\par  - Trimethoprim/Sulfamethoxazole: S <=0.5/9.5\par  Method Type: DARREN\par Organism: Methicillin resistant Staphylococcus aureus (06-05-22 @ 12:46)\par  Sensitivities:\par  - Ampicillin/Sulbactam: R 16/8\par  - Cefazolin: R 16\par  - Clindamycin: R >4\par  - Daptomycin: S 1\par  - Erythromycin: R >4\par  - Gentamicin: R >8 Should not be used as monotherapy\par  - Linezolid: S 2\par  - Oxacillin: R >2\par  - Penicillin: R >8\par  - Rifampin: S <=1 Should not be used as monotherapy\par  - Tetra/Doxy: S <=1\par  - Trimethoprim/Sulfamethoxazole: S <=0.5/9.5\par  - Vancomycin: S 2\par  Method Type: DARREN\par \par Culture - Blood (collected 05-29-22 @ 11:56)\par Source: .Blood Blood-Peripheral\par Final Report (06-03-22 @ 13:00):\par  No growth at 5 days.\par \par Culture - Blood (collected 05-29-22 @ 11:55)\par Source: .Blood Blood-Peripheral\par Final Report (06-03-22 @ 13:00):\par  No growth at 5 days.\par \par \par C-Reactive Protein, Serum: 17 mg/L (05-29-22 @ 11:52)\par \par Sedimentation Rate, Erythrocyte: 31 mm/hr (05-29-22 @ 11:52)\par \par \par COVID-19 PCR: NotDetec (06-06-22 @ 06:39)\par COVID-19 PCR: NotDetec (05-31-22 @ 13:21)\par SARS-CoV-2 Result: NotDetec (05-29-22 @ 11:54)\par \par \par =======================================================\par \par < from: MR Foot No Cont, Right (05.30.22 @ 19:51) >\par \par ACC: [id number] EXAM: MR FOOT RT\par \par PROCEDURE DATE: 05/30/2022\par \par \par \par INTERPRETATION: Clinical Information: Diabetes with foot ulcer\par \par Comparison: Right foot radiographs from 5/29/2022.\par \par Technique:\par MRI of the right midfoot and forefoot.\par Intravenous Contrast: None.\par \par Findings:\par \par There is a dorsal ulceration extending down to the bone over the proximal\par aspect of the fifth digit. There is hypointense T1 and hyperintense T2 marrow\par signal throughout the fifth metatarsal and the fifth digit. There is also\par abnormal marrow signal throughout the fourth metatarsal and the fourth proximal\par middle phalanges. These findings are consistent with osteomyelitis. There is\par minimal hyperintense T2 marrow signal within the head and neck of the third\par metatarsal and at the proximal aspect of the third proximal phalanx which could\par also be related to osteomyelitis. Evaluation for soft tissue infection is\par limited without intravenous contrast. There is hyperintenseT2 signal within the\par surrounding soft tissues and the muscles with relative sparing plantar medially\par which is consistent with myositis and cellulitis. The fifth flexor tendon is\par torn at the level of the fifth metatarsal phalangeal joint\par \par A 1.4 x 0.6 cm region of hypointense T2 signal at the medial aspect of the\par plantar aponeurosis at the level of the proximal first metatarsal is consistent\par with a plantar fibroma.\par \par Impression:\par Osteomyelitis throughout the fifth ray and the fourth metatarsal and fourth\par proximal and middle phalanges. Early osteomyelitis 21 also suspected at the\par head and neck of the third metatarsal and the base of the third proximal\par phalanx.\par \par --- End of Report ---\par \par \par \par DREAD VERA MD; Attending Radiologist\par This document has been electronically signed. May 31 2022 8:57AM\par \par < end of copied text >

## 2022-06-23 NOTE — HISTORY OF PRESENT ILLNESS
[FreeTextEntry1] : This 41 y/o M with DM on insulin who presents to the ED c/o right 5th toe\par infection. Patient noted infection about 1.5 months ago, reports that the toe\par was turning black with associated pain. Saw you physician, Dr. Ryan Degroot\par Efren Bravo, Telefax [phone], in [facility] who gave him a cream as\par well as PO antibiotics (3rd generation cephalosporin and Levaquin), which he\par has been taking for about a month. The wound was not healing properly so he\par went to a clinic and was informed to come to the ED for further care. Has\par chronic LE neuropathy.  \par \par he has been treated in [facility], [place] the [place]. Patient brought a doctors note from [facility], which stated that he's been treated since April 22, 2022. Per the note, he had been treated with a mix of\par 3rd generation cephalosporin, Levaquin 750mg daily, and secnidazole. he was also given topical Sulfrexal gel (Ketanserin) to be applied to the wound.\par \par He was admitted on May 29, 2022.\par He was last seen in the hospital on June 6, 2022.\par Ultra showed polymicrobial infection.  Including MRSA, ESBL E. coli and Pseudomonas.  He was recommended to start a course of antibiotics: \par meropenem IVPB 2000 milliGRAM(s) IV Intermittent every 8 hours\par vancomycin IVPB 1000 milliGRAM(s) IV Intermittent every 12 hours\par antibiotics thru 7/3/22\par \par Since leaving the hospital, his antibiotics has been given [facility] care.  He had recent elevations of his Vanco trough.  Requiring holding of his vancomycin.\par \par He still sutures in place he has recently seen a podiatrist for follow-up of his right foot.  This plan to remove the sutures next week if the foot is doing well.\par \par \par =======================================================\par Past Medical & Surgical Hx:\par =====================\par PAST MEDICAL & SURGICAL HISTORY:\par Diabetes\par H/O hand surgery\par \par Problem List:\par ==========\par HEALTH ISSUES - PROBLEM Dx:\par \par Social Hx:\par =======\par no toxic habits currently\par \par FAMILY HISTORY:\par No pertinent family history in first degree relatives\par \par no significant family history of immunosuppressive disorders in mother or\par father\par  =======================================================\par    \par Allergies\par No Known Allergies\par  \par \par \par

## 2022-07-14 ENCOUNTER — APPOINTMENT (OUTPATIENT)
Dept: INTERNAL MEDICINE | Facility: CLINIC | Age: 42
End: 2022-07-14

## 2022-07-14 VITALS
SYSTOLIC BLOOD PRESSURE: 114 MMHG | RESPIRATION RATE: 16 BRPM | OXYGEN SATURATION: 99 % | HEART RATE: 81 BPM | WEIGHT: 148 LBS | DIASTOLIC BLOOD PRESSURE: 76 MMHG | BODY MASS INDEX: 26.22 KG/M2 | HEIGHT: 63 IN

## 2022-07-14 DIAGNOSIS — E11.65 TYPE 2 DIABETES MELLITUS WITH HYPERGLYCEMIA: ICD-10-CM

## 2022-07-14 DIAGNOSIS — A49.02 METHICILLIN RESISTANT STAPHYLOCOCCUS AUREUS INFECTION, UNSPECIFIED SITE: ICD-10-CM

## 2022-07-14 DIAGNOSIS — A49.8 OTHER BACTERIAL INFECTIONS OF UNSPECIFIED SITE: ICD-10-CM

## 2022-07-14 DIAGNOSIS — A49.9 BACTERIAL INFECTION, UNSPECIFIED: ICD-10-CM

## 2022-07-14 DIAGNOSIS — Z16.12 OTHER BACTERIAL INFECTIONS OF UNSPECIFIED SITE: ICD-10-CM

## 2022-07-14 PROCEDURE — 99214 OFFICE O/P EST MOD 30 MIN: CPT

## 2022-07-14 NOTE — REVIEW OF SYSTEMS
[As Noted in HPI] : as noted in HPI [Skin Lesions] : skin lesion [Skin Wound] : skin wound [Negative] : Heme/Lymph [de-identified] : He still has a right foot wound, no significant drainage.  Getting home dressing care.

## 2022-07-14 NOTE — PHYSICAL EXAM
[General Appearance - Alert] : alert [General Appearance - In No Acute Distress] : in no acute distress [Sclera] : the sclera and conjunctiva were normal [PERRL With Normal Accommodation] : pupils were equal in size, round, reactive to light [Extraocular Movements] : extraocular movements were intact [Outer Ear] : the ears and nose were normal in appearance [Oropharynx] : the oropharynx was normal with no thrush [Neck Appearance] : the appearance of the neck was normal [Neck Cervical Mass (___cm)] : no neck mass was observed [Jugular Venous Distention Increased] : there was no jugular-venous distention [Thyroid Diffuse Enlargement] : the thyroid was not enlarged [Auscultation Breath Sounds / Voice Sounds] : lungs were clear to auscultation bilaterally [Heart Rate And Rhythm] : heart rate was normal and rhythm regular [Heart Sounds] : normal S1 and S2 [Heart Sounds Gallop] : no gallops [Murmurs] : no murmurs [Heart Sounds Pericardial Friction Rub] : no pericardial rub [Full Pulse] : the pedal pulses are present [Edema] : there was no peripheral edema [Bowel Sounds] : normal bowel sounds [Abdomen Soft] : soft [Abdomen Tenderness] : non-tender [Abdomen Mass (___ Cm)] : no abdominal mass palpated [Costovertebral Angle Tenderness] : no CVA tenderness [No Palpable Adenopathy] : no palpable adenopathy [Musculoskeletal - Swelling] : no joint swelling [Nail Clubbing] : no clubbing  or cyanosis of the fingernails [Motor Tone] : muscle strength and tone were normal [Skin Color & Pigmentation] : normal skin color and pigmentation [] : no rash [Cranial Nerves] : cranial nerves 2-12 were intact [Sensation] : the sensory exam was normal to light touch and pinprick [No Focal Deficits] : no focal deficits [Oriented To Time, Place, And Person] : oriented to person, place, and time [Affect] : the affect was normal [FreeTextEntry1] : Right foot status post resection of the fifth digit, incision site is clean and skin is well approximated.  Previous mention of dehiscence in the mid pole of the incision has resolved.  He still has sutures in place at both poles of the incision.  The skin is cool to the touch.

## 2022-07-14 NOTE — ASSESSMENT
[FreeTextEntry1] : At the current time, he is clinically improving.\par Patient has completed a course of meropenem and vancomycin on July 3, 2022.\par \par At the current time, his foot looks well.  No further antibiotics are needed at this time.\par \par He is to continue follow-up with his podiatrist for movable of the remainder of his sutures.\par \par I have advised the patient to continue using Betadine paint, and to cover the foot.  I anticipate that he will need to keep his foot protected in the next 1 to 2 months so that the skin will have adequate good time to heal properly.\par \par \par Patient may follow-up with this office as needed.  He reports that he does not have a primary care physician.  I have encouraged the patient to consider one of the primary care physicians at our office who speaks Somali.

## 2022-07-14 NOTE — HISTORY OF PRESENT ILLNESS
[FreeTextEntry1] : This 43 y/o M with DM, right 5th toe infection, complicated with resistant bacteria infection, he was admitted on May 29, 2022.\par He was last seen in the hospital on June 6, 2022. Ultra showed polymicrobial infection.  Including MRSA, ESBL E. coli and Pseudomonas.  He was recommended to start a course of antibiotics: \par meropenem IVPB 2000 milliGRAM(s) IV Intermittent every 8 hours\par vancomycin IVPB 1000 milliGRAM(s) IV Intermittent every 12 hours\par antibiotics thru 7/3/22\par \par He was last seen here on June 23, 2022.  Since the last visit, he has finished his antibiotics.\par \par He had some sutures removed earlier in the week.  He is following up with his podiatrist.  He continues to put Betadine paint onto his incision area of the foot.  There is no drainage, there is no pain.  There is no redness.\par \par =======================================================\par Past Medical & Surgical Hx:\par =====================\par PAST MEDICAL & SURGICAL HISTORY:\par Diabetes\par H/O hand surgery\par \par Problem List:\par ==========\par HEALTH ISSUES - PROBLEM Dx:\par \par Social Hx:\par =======\par no toxic habits currently\par \par FAMILY HISTORY:\par No pertinent family history in first degree relatives\par \par no significant family history of immunosuppressive disorders in mother or\par father\par  =======================================================\par    \par Allergies\par No Known Allergies\par  \par \par \par

## 2024-10-18 ENCOUNTER — APPOINTMENT (OUTPATIENT)
Dept: UROLOGY | Facility: CLINIC | Age: 44
End: 2024-10-18

## 2024-10-18 VITALS
RESPIRATION RATE: 16 BRPM | WEIGHT: 156 LBS | HEART RATE: 89 BPM | OXYGEN SATURATION: 98 % | BODY MASS INDEX: 27.64 KG/M2 | HEIGHT: 63 IN | DIASTOLIC BLOOD PRESSURE: 76 MMHG | SYSTOLIC BLOOD PRESSURE: 126 MMHG

## 2024-10-18 DIAGNOSIS — N52.9 MALE ERECTILE DYSFUNCTION, UNSPECIFIED: ICD-10-CM

## 2024-10-18 LAB
BILIRUB UR QL STRIP: NORMAL
CLARITY UR: CLEAR
COLLECTION METHOD: NORMAL
GLUCOSE UR-MCNC: NORMAL
HCG UR QL: 0.2 EU/DL
HGB UR QL STRIP.AUTO: NORMAL
KETONES UR-MCNC: NORMAL
LEUKOCYTE ESTERASE UR QL STRIP: NORMAL
NITRITE UR QL STRIP: NORMAL
PH UR STRIP: 5.5
PROT UR STRIP-MCNC: NORMAL
SP GR UR STRIP: 1.01

## 2024-10-18 PROCEDURE — 81003 URINALYSIS AUTO W/O SCOPE: CPT | Mod: QW

## 2024-10-18 PROCEDURE — 99203 OFFICE O/P NEW LOW 30 MIN: CPT

## 2024-10-18 RX ORDER — SILDENAFIL 20 MG/1
20 TABLET ORAL
Qty: 50 | Refills: 1 | Status: ACTIVE | COMMUNITY
Start: 2024-10-18 | End: 1900-01-01

## 2024-11-20 ENCOUNTER — APPOINTMENT (OUTPATIENT)
Dept: UROLOGY | Facility: CLINIC | Age: 44
End: 2024-11-20

## 2025-01-08 ENCOUNTER — NON-APPOINTMENT (OUTPATIENT)
Age: 45
End: 2025-01-08

## 2025-01-08 ENCOUNTER — APPOINTMENT (OUTPATIENT)
Dept: UROLOGY | Facility: CLINIC | Age: 45
End: 2025-01-08
Payer: MEDICAID

## 2025-01-08 VITALS
BODY MASS INDEX: 28.35 KG/M2 | DIASTOLIC BLOOD PRESSURE: 95 MMHG | WEIGHT: 160 LBS | HEART RATE: 97 BPM | HEIGHT: 63 IN | SYSTOLIC BLOOD PRESSURE: 139 MMHG

## 2025-01-08 LAB
BILIRUB UR QL STRIP: NORMAL
CLARITY UR: CLEAR
COLLECTION METHOD: NORMAL
GLUCOSE UR-MCNC: ABNORMAL
HCG UR QL: 0.2 EU/DL
HGB UR QL STRIP.AUTO: ABNORMAL
KETONES UR-MCNC: NORMAL
LEUKOCYTE ESTERASE UR QL STRIP: NORMAL
NITRITE UR QL STRIP: NORMAL
PH UR STRIP: 5.5
PROT UR STRIP-MCNC: ABNORMAL
SP GR UR STRIP: 1.01

## 2025-01-08 PROCEDURE — 99213 OFFICE O/P EST LOW 20 MIN: CPT

## 2025-01-08 PROCEDURE — 81003 URINALYSIS AUTO W/O SCOPE: CPT | Mod: QW

## 2025-08-01 ENCOUNTER — APPOINTMENT (OUTPATIENT)
Dept: UROLOGY | Facility: CLINIC | Age: 45
End: 2025-08-01
Payer: MEDICAID

## 2025-08-01 VITALS
BODY MASS INDEX: 27.46 KG/M2 | DIASTOLIC BLOOD PRESSURE: 104 MMHG | HEIGHT: 63 IN | HEART RATE: 99 BPM | WEIGHT: 155 LBS | SYSTOLIC BLOOD PRESSURE: 146 MMHG

## 2025-08-01 DIAGNOSIS — N52.9 MALE ERECTILE DYSFUNCTION, UNSPECIFIED: ICD-10-CM

## 2025-08-01 LAB
BILIRUB UR QL STRIP: NORMAL
CLARITY UR: CLEAR
COLLECTION METHOD: NORMAL
GLUCOSE UR-MCNC: ABNORMAL
HCG UR QL: 0.2 EU/DL
HGB UR QL STRIP.AUTO: ABNORMAL
KETONES UR-MCNC: NORMAL
LEUKOCYTE ESTERASE UR QL STRIP: NORMAL
NITRITE UR QL STRIP: NORMAL
PH UR STRIP: 5.5
PROT UR STRIP-MCNC: ABNORMAL
SP GR UR STRIP: >=1.03

## 2025-08-01 PROCEDURE — 81003 URINALYSIS AUTO W/O SCOPE: CPT | Mod: QW

## 2025-08-01 PROCEDURE — 99213 OFFICE O/P EST LOW 20 MIN: CPT

## 2025-08-27 ENCOUNTER — APPOINTMENT (OUTPATIENT)
Dept: UROLOGY | Facility: CLINIC | Age: 45
End: 2025-08-27

## (undated) DEVICE — DRSG ESMARK 6"

## (undated) DEVICE — SOL IRR POUR H2O 1000ML

## (undated) DEVICE — SOL IRR POUR NS 0.9% 1000ML

## (undated) DEVICE — SAW BLADE LINVATEC OSCILLATING 25.4X90X1.20MM

## (undated) DEVICE — DRSG WEBRIL 6"

## (undated) DEVICE — BLADE THIN 9.0MM

## (undated) DEVICE — DRSG ACE BANDAGE 6"

## (undated) DEVICE — DRSG XEROFORM 5"

## (undated) DEVICE — SYR CONTROL LUER LOK 10CC

## (undated) DEVICE — SPONGE GAUZE 12 PLY 4 X 8

## (undated) DEVICE — GLV 7.5 PROTEXIS

## (undated) DEVICE — SUT VICRYL 2-0 27" CT-2 UNDYED

## (undated) DEVICE — DRSG KERLIX ROLL 4.5"

## (undated) DEVICE — PACK EXTREMITY SOUTHSIDE

## (undated) DEVICE — BLANKET WARMER LOWER ADULT

## (undated) DEVICE — SUT ETHILON 3-0 18" PS-1

## (undated) DEVICE — GLV 8 PROTEXIS

## (undated) DEVICE — WRAP COMPRESSION CALF MED